# Patient Record
Sex: MALE | Race: WHITE | NOT HISPANIC OR LATINO | Employment: FULL TIME | URBAN - METROPOLITAN AREA
[De-identification: names, ages, dates, MRNs, and addresses within clinical notes are randomized per-mention and may not be internally consistent; named-entity substitution may affect disease eponyms.]

---

## 2017-04-13 ENCOUNTER — APPOINTMENT (OUTPATIENT)
Dept: LAB | Facility: HOSPITAL | Age: 57
End: 2017-04-13
Attending: COLON & RECTAL SURGERY
Payer: COMMERCIAL

## 2017-04-13 ENCOUNTER — GENERIC CONVERSION - ENCOUNTER (OUTPATIENT)
Dept: OTHER | Facility: OTHER | Age: 57
End: 2017-04-13

## 2017-04-13 ENCOUNTER — TRANSCRIBE ORDERS (OUTPATIENT)
Dept: LAB | Facility: HOSPITAL | Age: 57
End: 2017-04-13

## 2017-04-13 DIAGNOSIS — K59.1 FUNCTIONAL DIARRHEA: ICD-10-CM

## 2017-04-13 DIAGNOSIS — R19.4 FREQUENT BOWEL MOVEMENTS: ICD-10-CM

## 2017-04-13 DIAGNOSIS — K62.5 HEMORRHAGE OF RECTUM AND ANUS: Primary | ICD-10-CM

## 2017-04-13 DIAGNOSIS — K62.5 HEMORRHAGE OF RECTUM AND ANUS: ICD-10-CM

## 2017-04-13 LAB
ALBUMIN SERPL BCP-MCNC: 3.9 G/DL (ref 3.5–5)
ALP SERPL-CCNC: 48 U/L (ref 46–116)
ALT SERPL W P-5'-P-CCNC: 31 U/L (ref 12–78)
ANION GAP SERPL CALCULATED.3IONS-SCNC: 2 MMOL/L (ref 4–13)
AST SERPL W P-5'-P-CCNC: 24 U/L (ref 5–45)
BASOPHILS # BLD AUTO: 0.03 THOUSANDS/ΜL (ref 0–0.1)
BASOPHILS NFR BLD AUTO: 1 % (ref 0–1)
BILIRUB SERPL-MCNC: 0.52 MG/DL (ref 0.2–1)
BUN SERPL-MCNC: 16 MG/DL (ref 5–25)
CALCIUM SERPL-MCNC: 9.3 MG/DL (ref 8.3–10.1)
CHLORIDE SERPL-SCNC: 105 MMOL/L (ref 100–108)
CO2 SERPL-SCNC: 32 MMOL/L (ref 21–32)
CREAT SERPL-MCNC: 0.88 MG/DL (ref 0.6–1.3)
EOSINOPHIL # BLD AUTO: 0.13 THOUSAND/ΜL (ref 0–0.61)
EOSINOPHIL NFR BLD AUTO: 2 % (ref 0–6)
ERYTHROCYTE [DISTWIDTH] IN BLOOD BY AUTOMATED COUNT: 13.2 % (ref 11.6–15.1)
GFR SERPL CREATININE-BSD FRML MDRD: >60 ML/MIN/1.73SQ M
GLUCOSE SERPL-MCNC: 137 MG/DL (ref 65–140)
HCT VFR BLD AUTO: 33.5 % (ref 36.5–49.3)
HGB BLD-MCNC: 10.9 G/DL (ref 12–17)
LYMPHOCYTES # BLD AUTO: 1.39 THOUSANDS/ΜL (ref 0.6–4.47)
LYMPHOCYTES NFR BLD AUTO: 26 % (ref 14–44)
MCH RBC QN AUTO: 30.3 PG (ref 26.8–34.3)
MCHC RBC AUTO-ENTMCNC: 32.5 G/DL (ref 31.4–37.4)
MCV RBC AUTO: 93 FL (ref 82–98)
MONOCYTES # BLD AUTO: 0.49 THOUSAND/ΜL (ref 0.17–1.22)
MONOCYTES NFR BLD AUTO: 9 % (ref 4–12)
NEUTROPHILS # BLD AUTO: 3.29 THOUSANDS/ΜL (ref 1.85–7.62)
NEUTS SEG NFR BLD AUTO: 62 % (ref 43–75)
NRBC BLD AUTO-RTO: 0 /100 WBCS
PLATELET # BLD AUTO: 220 THOUSANDS/UL (ref 149–390)
PMV BLD AUTO: 9.7 FL (ref 8.9–12.7)
POTASSIUM SERPL-SCNC: 4.3 MMOL/L (ref 3.5–5.3)
PROT SERPL-MCNC: 7.2 G/DL (ref 6.4–8.2)
RBC # BLD AUTO: 3.6 MILLION/UL (ref 3.88–5.62)
SODIUM SERPL-SCNC: 139 MMOL/L (ref 136–145)
WBC # BLD AUTO: 5.35 THOUSAND/UL (ref 4.31–10.16)

## 2017-04-13 PROCEDURE — 87899 AGENT NOS ASSAY W/OPTIC: CPT | Performed by: COLON & RECTAL SURGERY

## 2017-04-13 PROCEDURE — 87045 FECES CULTURE AEROBIC BACT: CPT | Performed by: COLON & RECTAL SURGERY

## 2017-04-13 PROCEDURE — 87015 SPECIMEN INFECT AGNT CONCNTJ: CPT | Performed by: COLON & RECTAL SURGERY

## 2017-04-13 PROCEDURE — 87046 STOOL CULTR AEROBIC BACT EA: CPT | Performed by: COLON & RECTAL SURGERY

## 2017-04-13 PROCEDURE — 85025 COMPLETE CBC W/AUTO DIFF WBC: CPT

## 2017-04-13 PROCEDURE — 36415 COLL VENOUS BLD VENIPUNCTURE: CPT

## 2017-04-13 PROCEDURE — 87493 C DIFF AMPLIFIED PROBE: CPT | Performed by: COLON & RECTAL SURGERY

## 2017-04-13 PROCEDURE — 80053 COMPREHEN METABOLIC PANEL: CPT

## 2017-04-14 LAB — C DIFF TOX GENS STL QL NAA+PROBE: NORMAL

## 2017-04-15 LAB
BACTERIA STL CULT: NORMAL
BACTERIA STL CULT: NORMAL

## 2017-04-17 ENCOUNTER — HOSPITAL ENCOUNTER (OUTPATIENT)
Facility: HOSPITAL | Age: 57
Setting detail: OUTPATIENT SURGERY
Discharge: HOME | End: 2017-04-17
Attending: COLON & RECTAL SURGERY | Admitting: COLON & RECTAL SURGERY
Payer: COMMERCIAL

## 2017-04-17 ENCOUNTER — ANESTHESIA EVENT (OUTPATIENT)
Dept: GASTROENTEROLOGY | Facility: HOSPITAL | Age: 57
End: 2017-04-17
Payer: COMMERCIAL

## 2017-04-17 ENCOUNTER — ANESTHESIA (OUTPATIENT)
Dept: GASTROENTEROLOGY | Facility: HOSPITAL | Age: 57
End: 2017-04-17
Payer: COMMERCIAL

## 2017-04-17 ENCOUNTER — GENERIC CONVERSION - ENCOUNTER (OUTPATIENT)
Dept: OTHER | Facility: OTHER | Age: 57
End: 2017-04-17

## 2017-04-17 VITALS
DIASTOLIC BLOOD PRESSURE: 81 MMHG | TEMPERATURE: 97.6 F | RESPIRATION RATE: 16 BRPM | SYSTOLIC BLOOD PRESSURE: 123 MMHG | HEIGHT: 70 IN | OXYGEN SATURATION: 100 % | BODY MASS INDEX: 23.62 KG/M2 | HEART RATE: 74 BPM | WEIGHT: 165 LBS

## 2017-04-17 DIAGNOSIS — K62.5 RECTAL BLEEDING: ICD-10-CM

## 2017-04-17 PROCEDURE — 88305 TISSUE EXAM BY PATHOLOGIST: CPT | Performed by: INTERNAL MEDICINE

## 2017-04-17 RX ORDER — PROPOFOL 10 MG/ML
INJECTION, EMULSION INTRAVENOUS CONTINUOUS PRN
Status: DISCONTINUED | OUTPATIENT
Start: 2017-04-17 | End: 2017-04-17 | Stop reason: SURG

## 2017-04-17 RX ORDER — ALPRAZOLAM 0.25 MG/1
0.25 TABLET ORAL
COMMUNITY
End: 2018-02-20 | Stop reason: SDUPTHER

## 2017-04-17 RX ORDER — PROPOFOL 10 MG/ML
INJECTION, EMULSION INTRAVENOUS AS NEEDED
Status: DISCONTINUED | OUTPATIENT
Start: 2017-04-17 | End: 2017-04-17 | Stop reason: SURG

## 2017-04-17 RX ORDER — ASCORBIC ACID 500 MG
500 TABLET ORAL DAILY
COMMUNITY

## 2017-04-17 RX ORDER — PROPOFOL 10 MG/ML
INJECTION, EMULSION INTRAVENOUS CONTINUOUS PRN
Status: DISCONTINUED | OUTPATIENT
Start: 2017-04-17 | End: 2017-04-17

## 2017-04-17 RX ORDER — SODIUM CHLORIDE 9 MG/ML
20 INJECTION, SOLUTION INTRAVENOUS CONTINUOUS
Status: DISCONTINUED | OUTPATIENT
Start: 2017-04-17 | End: 2017-04-17 | Stop reason: HOSPADM

## 2017-04-17 RX ORDER — LORAZEPAM 0.5 MG/1
0.5 TABLET ORAL AS NEEDED
COMMUNITY
End: 2018-02-20 | Stop reason: CLARIF

## 2017-04-17 RX ADMIN — SODIUM CHLORIDE 20 ML/HR: 0.9 INJECTION, SOLUTION INTRAVENOUS at 08:07

## 2017-04-17 RX ADMIN — PROPOFOL 120 MCG/KG/MIN: 10 INJECTION, EMULSION INTRAVENOUS at 09:38

## 2017-04-17 RX ADMIN — PROPOFOL 110 MG: 10 INJECTION, EMULSION INTRAVENOUS at 08:07

## 2017-04-17 RX ADMIN — LIDOCAINE HYDROCHLORIDE 100 MG: 20 INJECTION, SOLUTION INTRAVENOUS at 08:07

## 2017-08-01 ENCOUNTER — LAB CONVERSION - ENCOUNTER (OUTPATIENT)
Dept: OTHER | Facility: OTHER | Age: 57
End: 2017-08-01

## 2017-08-01 LAB
BASOPHILS # BLD AUTO: 0.7 %
BASOPHILS # BLD AUTO: 54 CELLS/UL (ref 0–200)
DEPRECATED RDW RBC AUTO: 13.7 % (ref 11–15)
EOSINOPHIL # BLD AUTO: 254 CELLS/UL (ref 15–500)
EOSINOPHIL # BLD AUTO: 3.3 %
HCT VFR BLD AUTO: 45.6 % (ref 38.5–50)
HGB BLD-MCNC: 14.6 G/DL (ref 13.2–17.1)
LYMPHOCYTES # BLD AUTO: 2533 CELLS/UL (ref 850–3900)
LYMPHOCYTES # BLD AUTO: 32.9 %
MCH RBC QN AUTO: 28.1 PG (ref 27–33)
MCHC RBC AUTO-ENTMCNC: 32 G/DL (ref 32–36)
MCV RBC AUTO: 87.9 FL (ref 80–100)
MONOCYTES # BLD AUTO: 670 CELLS/UL (ref 200–950)
MONOCYTES (HISTORICAL): 8.7 %
NEUTROPHILS # BLD AUTO: 4189 CELLS/UL (ref 1500–7800)
NEUTROPHILS # BLD AUTO: 54.4 %
PLATELET # BLD AUTO: 240 THOUSAND/UL (ref 140–400)
PMV BLD AUTO: 10.4 FL (ref 7.5–12.5)
RBC # BLD AUTO: 5.19 MILLION/UL (ref 4.2–5.8)
WBC # BLD AUTO: 7.7 THOUSAND/UL (ref 3.8–10.8)

## 2017-08-02 ENCOUNTER — GENERIC CONVERSION - ENCOUNTER (OUTPATIENT)
Dept: OTHER | Facility: OTHER | Age: 57
End: 2017-08-02

## 2017-08-17 ENCOUNTER — ALLSCRIPTS OFFICE VISIT (OUTPATIENT)
Dept: OTHER | Facility: OTHER | Age: 57
End: 2017-08-17

## 2018-01-10 NOTE — PROGRESS NOTES
Chief Complaint  Adacel ( Tdap) administered L deltoid lot # [id number] Expiration date 6/5/2018, Zostavax ( shingles) administered R upper arm subcutaneous Lot # [id number] Exp 2/17/2017  Pt waited 10 minutes in room with no reaction were noticed  Active Problems    1  Chronic lower back pain (724 2,338 29) (M54 5,G89 29)   2  Elevated blood pressure (401 9) (I10)   3  Encounter for preventive health examination (V70 0) (Z00 00)   4  Encounter for prostate cancer screening (V76 44) (Z12 5)   5  Need for diphtheria-tetanus-pertussis (Tdap) vaccine, adult/adolescent (V06 1) (Z23)   6  Need for shingles vaccine (V04 89) (Z23)    Current Meds   1  Aleve TABS; Therapy: (Recorded:09Egc3864) to Recorded   2  Vitamin C CAPS; Therapy: (Recorded:64Fqn4984) to Recorded    Allergies    1  No Known Drug Allergies    Plan  Need for diphtheria-tetanus-pertussis (Tdap) vaccine, adult/adolescent    · Tdap (Adacel)  Need for shingles vaccine    · Zoster (Zostavax)    Future Appointments    Date/Time Provider Specialty Site   08/18/2016 09:40 AM Oma Florez MD Family Medicine [address] [place] [address]     Signatures   Electronically signed by :  Jacqui Esparza MD; May 24 2016  5:26PM EST                       (Review)

## 2018-01-12 NOTE — PROGRESS NOTES
Assessment    1  Encounter for preventive health examination (V70 0) (Z00 00)   2  Hyperlipidemia (272 4) (E78 5)   3  IFG (impaired fasting glucose) (790 21) (R73 01)   4  Encounter for prostate cancer screening (V76 44) (Z12 5)   5  Change in mole (216 9) (L81 9)    Plan  Acne vulgaris    · Minocycline HCl - 50 MG Oral Capsule; take 1 capsule daily  Change in mole    · 2 - Mary RAY, Mony Gibbons  (Dermatology) Co-Management  *  Status: Hold For -  Scheduling  Requested for: 02IKZ8638  Care Summary provided  : Yes  Chronic lower back pain    · Aleve TABS   · Cyclobenzaprine HCl - 10 MG Oral Tablet  Encounter for preventive health examination    · *VB-Depression Screening; Status:Complete;   Done: 31KUL8355 10:32AM  Encounter for preventive health examination, Encounter for prostate cancer screening,  Hyperlipidemia, IFG (impaired fasting glucose)    · (Q) COMPREHENSIVE METABOLIC PNL W/ADJUSTED CALCIUM; Status:Active; Requested for:83Zps1252;    · (Q) HEMOGLOBIN A1c WITH eAG; Status:Active; Requested for:83Ojd8898;    · (Q) LIPID PANEL WITH REFLEX TO DIRECT LDL; Status:Active; Requested  for:27Azr7045;    · (Q) PSA, TOTAL WITH REFLEX TO PSA, FREE; Status:Active; Requested  for:41Kcb6953;   Hyperlipidemia    · Eat a low fat and low cholesterol diet ; Status:Complete;   Done: 93XJT0965 10:31AM   · Eat no more than 30 grams of fat per day ; Status:Complete;   Done: 21RQH5041  10:31AM  IFG (impaired fasting glucose)    · Diets that are low in carbohydrates and high in protein are very popular for weight loss ;  Status:Complete;   Done: 95WYU3464 10:31AM  Unlinked    · Vitamin C CAPS    Discussion/Summary  health maintenance visit Currently, he eats a healthy diet  Prostate cancer screening: the risks and benefits of prostate cancer screening were discussed and PSA was ordered  Colorectal cancer screening: the risks and benefits of colorectal cancer screening were discussed and colorectal cancer screening is current  Screening lab work includes glucose and lipid profile  The risks and benefits of immunizations were discussed and immunizations are up to date  Advice and education were given regarding helmet use, weight bearing exercise, seat belt use and sunscreen use  Patient discussion: discussed with the patient  The patient was counseled on Hepatitis C screening  Reviewed lab in 8/2017  CBC ok  CMP showed glu 114  low carb diet  Lipid 229/104/65/143 low fat diet  Refer to dermatology for mole check/acne  Colonoscopy 4/2017 uptodate  Will check PSA  Pros and cons educated pt  RTO in 6 months with labs  Possible side effects of new medications were reviewed with the patient/guardian today  The treatment plan was reviewed with the patient/guardian  The patient/guardian understands and agrees with the treatment plan      Chief Complaint  Patient here for yearly physical exam       History of Present Illness  , Adult Male: The patient is being seen for a health maintenance evaluation  The last health maintenance visit was 1 year(s) ago  Social History: Household members include spouse  He is   Work status: working full time and occupation: [employer]  The patient has never smoked cigarettes  He reports drinking 1 drinks per day and beer  He has never used illicit drugs  General Health: The patient's health since the last visit is described as good  He has regular dental visits  He denies vision problems  He denies hearing loss  Immunizations status: up to date  Lifestyle:  He consumes a diverse and healthy diet  He has weight concerns  He exercises regularly  He does not use tobacco  He denies alcohol use  He denies drug use  Screening: Prostate cancer screening includes prostate-specific antigen testing last year  Colorectal cancer screening includes a colonoscopy within the past ten years     Metabolic screening includes lipid profile performed within the past five years, glucose screening performed last year and thyroid function test performed last year  Cardiovascular risk factors: no hypertension, no diabetes, no high LDL cholesterol and no obesity  General health risks: no elevated prostate-specific antigen  Safety elements used: seat belt  Risk findings: no anxiety symptoms and no depression symptoms  HPI: Pt is here by himself  c/o mole in the back for years  Bigger now  No itchy, no pain  Pt states he always had sunburn while in the sun  Acne on face for years  He used minocycline as needed  Never seen dermatology  Hyperlipidemia---8/2017 lipid 229/104/65/143 Not on any meds  IFG---8/2017 fasting glucose 114  Anxiety---Pt had rectal bleeding in 4/2017  Colonoscopy 4/2017 was normal  Pt thought it caused by escitalopram, so he stopped it  Mood is OK now  Review of Systems    Constitutional: No fever or chills, feels well, no tiredness, no recent weight gain or weight loss  ENT: no complaints of earache, no hearing loss, no nosebleeds, no nasal discharge, no sore throat, no hoarseness  Cardiovascular: No complaints of slow heart rate, no fast heart rate, no chest pain, no palpitations, no leg claudication, no lower extremity  Respiratory: No complaints of shortness of breath, no wheezing, no cough, no SOB on exertion, no orthopnea or PND  Gastrointestinal: No complaints of abdominal pain, no constipation, no nausea or vomiting, no diarrhea or bloody stools  Musculoskeletal: No complaints of arthralgia, no myalgias, no joint swelling or stiffness, no limb pain or swelling  Active Problems    1  Acne vulgaris (706 1) (L70 0)   2  Anxiety (300 00) (F41 9)   3  Chronic lower back pain (724 2,338 29) (M54 5,G89 29)   4  Elevated blood pressure   5  Encounter for preventive health examination (V70 0) (Z00 00)   6  Encounter for prostate cancer screening (V76 44) (Z12 5)   7  Hyperlipidemia (272 4) (E78 5)   8  Insomnia (780 52) (G47 00)   9   Need for diphtheria-tetanus-pertussis (Tdap) vaccine, adult/adolescent (V06 1) (Z23)   10  Need for pneumococcal vaccination (V03 82) (Z23)   11  Need for shingles vaccine (V04 89) (Z23)    Past Medical History    · History of Back spasm (724 8) (M62 830)   · History of Blood in stool (578 1) (K92 1)   · History of High cholesterol (272 0) (E78 00)   · History of anemia (V12 3) (Z86 2)   · History of arthritis (V13 4) (Z87 39)   · History of backache (V13 59) (Z87 39)   · History of migraine (V12 49) (Z86 69)   · History of varicella (V12 09) (Z86 19)   · History of Knee pain (719 46) (M25 569)   · History of Tension (V62 89) (F48 9)    Surgical History    · History of Hernia Repair    Family History  Mother    · Family history of Colon cancer   · Family history of diabetes mellitus (V18 0) (Z83 3)  Father    · Family history of Heart problem    Social History    · Cigar smoker (305 1) (F17 290)   · Social drinker    Current Meds   1  Aleve TABS; Therapy: (Recorded:24Fgx3992) to Recorded   2  ALPRAZolam 0 25 MG Oral Tablet; TAKE 1 TABLET Daily PRN anxiety; Therapy: 39RWO7147 to (Evaluate:07Jan2017); Last Rx:77Fyz1483 Ordered   3  Cyclobenzaprine HCl - 10 MG Oral Tablet; take 1 tablet by mouth at bedtime if needed; Therapy: 06NNF8541 to (Evaluate:07Jan2017)  Requested for: 91DSF3395; Last   Rx:01Nyl6522 Ordered   4  Escitalopram Oxalate 5 MG Oral Tablet; TAKE 1 TABLET DAILY; Therapy: 70OUZ7738 to (Evaluate:06Jun2017)  Requested for: 07FCF8984; Last   Rx:05Znj8370; Status: ACTIVE - Renewal Denied Ordered   5  Minocycline HCl - 50 MG Oral Capsule; take 1 capsule daily; Therapy: 63FSC3778 to (Evaluate:89Jxo2943)  Requested for: 74Fmd0599; Last   Rx:38Dat7455 Ordered   6  TraZODone HCl - 50 MG Oral Tablet; Take 1 tablet daily; Therapy: 24PZZ5989 to (Evaluate:07Jan2017)  Requested for: 01HZU8669; Last   Rx:08Dec2016; Status: ACTIVE - Renewal Denied Ordered   7  Vitamin C CAPS;    Therapy: (Recorded:83Ovm2235) to Recorded    Allergies    1  No Known Drug Allergies    Vitals   Recorded: 17Aug2017 09:45AM   Temperature 97 7 F, Tympanic   Heart Rate 72, L Radial   Respiration 16   Systolic 796, LUE   Diastolic 88, LUE   Height 5 ft 9 5 in   Weight 155 lb 3 2 oz   BMI Calculated 22 59   BSA Calculated 1 86   Pain Scale 3     Physical Exam    Constitutional   General appearance: No acute distress, well appearing and well nourished  Ears, Nose, Mouth, and Throat   External inspection of ears and nose: Normal     Otoscopic examination: Tympanic membranes translucent with normal light reflex  Canals patent without erythema  Nasal mucosa, septum, and turbinates: Normal without edema or erythema  Lips, teeth, and gums: Normal, good dentition  Oropharynx: Normal with no erythema, edema, exudate or lesions  Neck   Neck: Supple, symmetric, trachea midline, no masses  Thyroid: Normal, no thyromegaly  Pulmonary   Respiratory effort: No increased work of breathing or signs of respiratory distress  Auscultation of lungs: Clear to auscultation  Cardiovascular   Auscultation of heart: Normal rate and rhythm, normal S1 and S2, no murmurs  Carotid pulses: 2+ bilaterally  Examination of extremities for edema and/or varicosities: Normal     Abdomen   Abdomen: Non-tender, no masses  Liver and spleen: No hepatomegaly or splenomegaly  Lymphatic   Palpation of lymph nodes in neck: No lymphadenopathy  Musculoskeletal   Gait and station: Normal        Signatures   Electronically signed by :  Elgin Kent MD; Aug 17 2017 10:32AM EST                       (Author)

## 2018-01-14 VITALS
DIASTOLIC BLOOD PRESSURE: 88 MMHG | HEART RATE: 72 BPM | TEMPERATURE: 97.7 F | BODY MASS INDEX: 22.22 KG/M2 | HEIGHT: 70 IN | SYSTOLIC BLOOD PRESSURE: 132 MMHG | WEIGHT: 155.2 LBS | RESPIRATION RATE: 16 BRPM

## 2018-01-14 NOTE — RESULT NOTES
Verified Results  (Q) COMPREHENSIVE METABOLIC PNL W/ADJUSTED CALCIUM 54Wob6679 09:33AM [facility]     Test Name Result Flag Reference   GLUCOSE 114 mg/dL H 65-99   Fasting reference interval     For someone without known diabetes, a glucose value  between 100 and 125 mg/dL is consistent with  prediabetes and should be confirmed with a  follow-up test    UREA NITROGEN (BUN) 17 mg/dL  7-25   CREATININE 0 93 mg/dL  0 70-1 33   For patients >52years of age, the reference limit  for Creatinine is approximately 13% higher for people  identified as -American  eGFR NON-AFR  AMERICAN 91 mL/min/1 73m2  > OR = 60   eGFR AFRICAN AMERICAN 106 mL/min/1 73m2  > OR = 60   BUN/CREATININE RATIO   2-48   NOT APPLICABLE (calc)   SODIUM 141 mmol/L  135-146   POTASSIUM 4 8 mmol/L  3 5-5 3   CHLORIDE 104 mmol/L     CARBON DIOXIDE 31 mmol/L  20-31   CALCIUM 9 6 mg/dL  8 6-10 3   CALCIUM (ADJUSTED FOR$ALBUMIN) 9 7 mg/dL (calc)  8 6-10 2   PROTEIN, TOTAL 6 9 g/dL  6 1-8 1   ALBUMIN 4 3 g/dL  3 6-5 1   GLOBULIN 2 6 g/dL (calc)  1 9-3 7   ALBUMIN/GLOBULIN RATIO 1 7 (calc)  1 0-2 5   BILIRUBIN, TOTAL 0 5 mg/dL  0 2-1 2   ALKALINE PHOSPHATASE 59 U/L     AST 21 U/L  10-35   ALT 20 U/L  9-46     (Q) LIPID PANEL WITH REFLEX TO DIRECT LDL 78Fys6665 09:33AM [facility]     Test Name Result Flag Reference   CHOLESTEROL, TOTAL 229 mg/dL H 125-200   HDL CHOLESTEROL 65 mg/dL  > OR = 40   TRIGLICERIDES 006 mg/dL  <150   LDL-CHOLESTEROL 143 mg/dL (calc) H <130   Desirable range <100 mg/dL for patients with CHD or  diabetes and <70 mg/dL for diabetic patients with  known heart disease  CHOL/HDLC RATIO 3 5 (calc)  < OR = 5 0   NON HDL CHOLESTEROL 164 mg/dL (calc) H    Target for non-HDL cholesterol is 30 mg/dL higher than   LDL cholesterol target       (Q) CBC (INCLUDES DIFF/PLT) (REFL) [id number] 09:33AM Annemaru De Dios   REPORT COMMENT:  FASTING:YES     Test Name Result Flag Reference   WHITE BLOOD CELL COUNT 7 7 Thousand/uL  3 8-10 8 RED BLOOD CELL COUNT 5 19 Million/uL  4 20-5 80   HEMOGLOBIN 14 6 g/dL  13 2-17 1   HEMATOCRIT 45 6 %  38 5-50 0   MCV 87 9 fL  80 0-100 0   MCH 28 1 pg  27 0-33 0   MCHC 32 0 g/dL  32 0-36 0   RDW 13 7 %  11 0-15 0   PLATELET COUNT 090 Thousand/uL  140-400   MPV 10 4 fL  7 5-12 5   ABSOLUTE NEUTROPHILS 4189 cells/uL  0965-1008   ABSOLUTE LYMPHOCYTES 2533 cells/uL  850-3900   ABSOLUTE MONOCYTES 670 cells/uL  200-950   ABSOLUTE EOSINOPHILS 254 cells/uL     ABSOLUTE BASOPHILS 54 cells/uL  0-200   NEUTROPHILS 54 4 %     LYMPHOCYTES 32 9 %     MONOCYTES 8 7 %     EOSINOPHILS 3 3 %     BASOPHILS 0 7 %

## 2018-01-15 NOTE — PROGRESS NOTES
Chief Complaint  Pneumovax administered Today L deltoid lot # [id number] Exp 10/8/2017  Active Problems    1  Chronic lower back pain (724 2,338 29) (M54 5,G89 29)   2  Elevated blood pressure (401 9) (I10)   3  Encounter for preventive health examination (V70 0) (Z00 00)   4  Encounter for prostate cancer screening (V76 44) (Z12 5)   5  Need for diphtheria-tetanus-pertussis (Tdap) vaccine, adult/adolescent (V06 1) (Z23)   6  Need for pneumococcal vaccination (V03 82) (Z23)   7  Need for shingles vaccine (V04 89) (Z23)    Current Meds   1  Aleve TABS; Therapy: (Recorded:53Ikh6249) to Recorded   2  Vitamin C CAPS; Therapy: (Recorded:26Npk3550) to Recorded    Allergies    1  No Known Drug Allergies    Plan  Need for pneumococcal vaccination    · Pneumo (Pneumovax)    Future Appointments    Date/Time Provider Specialty Site   08/18/2016 09:40 AM Radha Walker MD Family Medicine [address] [place] [address]     Signatures   Electronically signed by :  Saúl Ma MD; Jun 23 2016  3:52PM EST                       (Review)

## 2018-01-15 NOTE — RESULT NOTES
Message   Total cholesterol 216 elevated   triglyceride 71 normal   HDL 76 good    ok   Pt should follow low fat diet     electrolytes normal   kidney function normal   liver enzymes normal   WBC normal   hemoglobin normal   platelet normal   PSA normal   TSH normal     Verified Results  (Q) CBC (INCLUDES DIFF/PLT) (REFL) [id number] 10:23AM [facility]     Test Name Result Flag Reference   WHITE BLOOD CELL COUNT 6 9 Thousand/uL  3 8-10 8   RED BLOOD CELL COUNT 4 85 Million/uL  4 20-5 80   HEMOGLOBIN 14 6 g/dL  13 2-17 1   HEMATOCRIT 44 9 %  38 5-50 0   MCV 92 5 fL  80 0-100 0   MCH 30 1 pg  27 0-33 0   MCHC 32 5 g/dL  32 0-36 0   RDW 13 9 %  11 0-15 0   PLATELET COUNT 052 Thousand/uL  140-400   MPV 8 4 fL  7 5-11 5   ABSOLUTE NEUTROPHILS 4181 cells/uL  0435-9599   ABSOLUTE LYMPHOCYTES 1815 cells/uL  850-3900   ABSOLUTE MONOCYTES 511 cells/uL  200-950   ABSOLUTE EOSINOPHILS 352 cells/uL     ABSOLUTE BASOPHILS 41 cells/uL  0-200   NEUTROPHILS 60 6 %     LYMPHOCYTES 26 3 %     MONOCYTES 7 4 %     EOSINOPHILS 5 1 %     BASOPHILS 0 6 %       (Q) COMPREHENSIVE METABOLIC PNL W/ADJUSTED CALCIUM [id number] 10:23AM [facility]     Test Name Result Flag Reference   GLUCOSE 90 mg/dL  65-99   Fasting reference interval   UREA NITROGEN (BUN) 21 mg/dL  7-25   CREATININE 0 83 mg/dL  0 70-1 33   For patients >52years of age, the reference limit  for Creatinine is approximately 13% higher for people  identified as -American  eGFR NON-AFR   AMERICAN 99 mL/min/1 73m2  > OR = 60   eGFR AFRICAN AMERICAN 115 mL/min/1 73m2  > OR = 60   BUN/CREATININE RATIO   7-40   NOT APPLICABLE (calc)   SODIUM 138 mmol/L  135-146   POTASSIUM 4 3 mmol/L  3 5-5 3   CHLORIDE 101 mmol/L     CARBON DIOXIDE 27 mmol/L  19-30   CALCIUM 9 3 mg/dL  8 6-10 3   CALCIUM (ADJUSTED FOR$ALBUMIN) 9 3 mg/dL (calc)  8 6-10 2   PROTEIN, TOTAL 6 9 g/dL  6 1-8 1   ALBUMIN 4 4 g/dL  3 6-5 1   GLOBULIN 2 5 g/dL (calc)  1 9-3 7   ALBUMIN/GLOBULIN RATIO 1 8 (calc)  1 0-2 5   BILIRUBIN, TOTAL 0 4 mg/dL  0 2-1 2   ALKALINE PHOSPHATASE 52 U/L     AST 24 U/L  10-35   ALT 19 U/L  9-46     (Q) LIPID PANEL WITH REFLEX TO DIRECT LDL [id number] 10:23 [facility]     Test Name Result Flag Reference   CHOLESTEROL, TOTAL 216 mg/dL H 125-200   HDL CHOLESTEROL 76 mg/dL  > OR = 40   TRIGLICERIDES 71 mg/dL  <028   LDL-CHOLESTEROL 126 mg/dL (calc)  <130   Desirable range <100 mg/dL for patients with CHD or  diabetes and <70 mg/dL for diabetic patients with  known heart disease  CHOL/HDLC RATIO 2 8 (calc)  < OR = 5 0   NON HDL CHOLESTEROL 140 mg/dL (calc)     Target for non-HDL cholesterol is 30 mg/dL higher than   LDL cholesterol target  (Q) PSA, TOTAL WITH REFLEX TO PSA, FREE [id number] 10:23 [facility]     Test Name Result Flag Reference   TOTAL PSA 0 9 ng/mL  < OR = 4 0   This test was performed using the [employer]  immunoassay method  Values obtained with other assay  methods cannot be used interchangeably  PSA levels,  regardless of value, should not be interpreted as  absolute evidence of the presence or absence of disease       (Q) TSH, 3RD GENERATION W/REFLEX TO FT4 [id number] 10:23AM [facility]   REPORT COMMENT:  FASTING:YES     Test Name Result Flag Reference   TSH W/REFLEX TO FT4 2 02 mIU/L  0 40-4 50

## 2018-01-15 NOTE — PROGRESS NOTES
Assessment    1  Encounter for preventive health examination (V70 0) (Z00 00)   2  Elevated blood pressure (401 9) (I10)   3  Chronic lower back pain (724 2,338 29) (M54 5,G89 29)   4  Cigar smoker (305 1) (F17 290)   5  Encounter for prostate cancer screening (V76 44) (Z12 5)    Plan  Chronic lower back pain, Elevated blood pressure, Encounter for preventive health  examination    · (1) URINALYSIS w URINE C/S REFLEX (will reflex a microscopy if leukocytes, occult  blood, or nitrites are not within normal limits); Status:Active; Requested for:01Alu2768;    · (Q) CBC (INCLUDES DIFF/PLT) (REFL); Status:Active; Requested for:08Fhx0360;    · (Q) COMPREHENSIVE METABOLIC PNL W/ADJUSTED CALCIUM; Status:Active; Requested for:31Qdz9653;    · (Q) LIPID PANEL WITH REFLEX TO DIRECT LDL; Status:Active; Requested  for:63Isw1418;    · (Q) TSH, 3RD GENERATION W/REFLEX TO FT4; Status:Active; Requested  for:46Gdy5917;   Elevated blood pressure    · Take your blood pressure twice a day, varying the time of day you check it  Record the  numbers, and bring them with you to your appointment ; Status:Complete;   Done:  77IZK8744 03:05PM   · We want to put you on the DASH diet for count1 calories ; Status:Complete;   Done:  37QRB4392 03:05PM   · You need to quit smoking ; Status:Complete;   Done: 05GJD2919 03:05PM   · Call [phone] if: You become dizzy or lightheaded, especially when you stand up  after sitting for a while ; Status:Complete;   Done: 02CXF5127 03:05PM   · Call [phone] if: You develop double vision (see two of everything) ;  Status:Complete;   Done: 15PCL0105 03:05PM   · Call [phone] if: Your blood pressure is frequently higher than 140/90 ;  Status:Complete;   Done: 62XYK2311 03:05PM   · Call 911 if:  You experience a new kind of chest pain (angina) or pressure ;  Status:Complete;   Done: 33QUQ7920 03:05PM   · Call 911 if: You have any symptoms of a stroke ; Status:Complete;   Done: 11XVZ9042  03:05PM   · Seek Immediate Medical Attention if: You have a severe headache that will not go away ;  Status:Complete;   Done: 03VLB2596 03:05PM   · Seek Immediate Medical Attention if: Your blood pressure is greater than 250/120 for 2  consecutive readings ; Status:Complete;   Done: 13ETA8209 03:05PM  Encounter for prostate cancer screening    · (Q) PSA, TOTAL WITH REFLEX TO PSA, FREE; Status:Active; Requested  for:28Hkk7489; Unlinked    · Vitamin C CAPS    Discussion/Summary  Impression: health maintenance visit  Currently, he eats a healthy diet and has an inadequate exercise regimen  Prostate cancer screening: the risks and benefits of prostate cancer screening were discussed and PSA was ordered  Colorectal cancer screening: the risks and benefits of colorectal cancer screening were discussed, colorectal cancer screening is current and Due for 2017 per pt  Screening lab work includes glucose, lipid profile and urinalysis  The risks and benefits of immunizations were discussed and Pt will call insurance for coverage of flu shot, zostavax and Tdap  Advice and education were given regarding nutrition, aerobic exercise, sunscreen use and seat belt use  Patient discussion: discussed with the patient  BP elevated in office today  Advised pt to check BP at home  Call office if BP always >150/90  DASH diet  Will check labs  Will check insurance for coverage of immunizations  FU colonoscopy as scheduled  Will check PSA  Pros and cons educated pt  RTO in 3 months for BP check  Chief Complaint  physical exam      History of Present Illness  HM, Adult Male: The patient is being seen for a health maintenance evaluation  The last health maintenance visit was 4 year(s) ago  Social History: Household members include live by himself  Work status: working full time and occupation: labor in [employer]  The patient smoke cigar daily   He reports frequent alcohol use, drinking 3 drinks per week and beer  He has never used illicit drugs  General Health: The patient's health since the last visit is described as good  He has regular dental visits  He denies vision problems  He denies hearing loss  Immunizations status: not up to date   refused flu shot  Lifestyle:  He consumes a diverse and healthy diet  He does not have any weight concerns  He does not exercise regularly  He uses tobacco  He consumes alcohol  He denies drug use  Screening: Prostate cancer screening includes uncertain timing of prostate-specific antigen testing  Colorectal cancer screening includes last colonoscopy done 2011 and Mother had colon cancer at age of 72  Metabolic screening includes uncertain timing of his last lipid profile, uncertain timing of his last glucose screening and uncertain timing of his last thyroid function test      Cardiovascular risk factors: high LDL cholesterol, but no hypertension, no diabetes and no low HDL cholesterol  Safety elements used: seat belt  Risk findings: no anxiety symptoms and no depression symptoms  HPI: Pt is here to establish care and for a physical exam    Last PCP was 4 years ago  Feels fine  Pt states he does a lot of physical work for his job  Has chronic lower back pain, aleve helped  Hx of hyperlipidemia, was on simvastatin 10mg before, but stopped for a while  Review of Systems    Constitutional: No fever or chills, feels well, no tiredness, no recent weight gain or weight loss  ENT: no complaints of earache, no hearing loss, no nosebleeds, no nasal discharge, no sore throat, no hoarseness  Cardiovascular: No complaints of slow heart rate, no fast heart rate, no chest pain, no palpitations, no leg claudication, no lower extremity  Respiratory: No complaints of shortness of breath, no wheezing, no cough, no SOB on exertion, no orthopnea or PND     Gastrointestinal: No complaints of abdominal pain, no constipation, no nausea or vomiting, no diarrhea or bloody stools  Musculoskeletal: No complaints of arthralgia, no myalgias, no joint swelling or stiffness, no limb pain or swelling  Past Medical History    · History of High cholesterol (272 0) (E78 0)   · History of backache (V13 59) (Z87 39)   · History of Knee pain (719 46) (M25 569)    Surgical History    · History of Hernia Repair    Family History  Mother    · Family history of Colon cancer   · Family history of diabetes mellitus (V18 0) (Z83 3)  Father    · Family history of Heart problem    Social History    · Cigar smoker (305 1) (F17 290)   · Social drinker    Current Meds   1  Aleve TABS; Therapy: (Recorded:77Ygg7252) to Recorded   2  Vitamin C CAPS; Therapy: (Recorded:06Boc0955) to Recorded    Allergies    1  No Known Drug Allergies    Vitals   Recorded: 43XXN3016 02:52PM Recorded: 81FKF0906 02:28PM   Temperature  97 4 F   Heart Rate  94   Respiration  16   Systolic 988 096   Diastolic 401 84   Height  5 ft 9 5 in   Weight  153 lb 4 00 oz   BMI Calculated  22 31   BSA Calculated  1 86   O2 Saturation  96   Pain Scale  0     Physical Exam    Constitutional   General appearance: No acute distress, well appearing and well nourished  Ears, Nose, Mouth, and Throat   External inspection of ears and nose: Normal     Otoscopic examination: Tympanic membranes translucent with normal light reflex  Canals patent without erythema  Nasal mucosa, septum, and turbinates: Normal without edema or erythema  Lips, teeth, and gums: Normal, good dentition  Oropharynx: Normal with no erythema, edema, exudate or lesions  Neck   Neck: Supple, symmetric, trachea midline, no masses  Thyroid: Normal, no thyromegaly  Pulmonary   Respiratory effort: No increased work of breathing or signs of respiratory distress  Auscultation of lungs: Clear to auscultation  Cardiovascular   Auscultation of heart: Normal rate and rhythm, normal S1 and S2, no murmurs  Carotid pulses: 2+ bilaterally      Examination of extremities for edema and/or varicosities: Normal     Abdomen   Abdomen: Non-tender, no masses  Liver and spleen: No hepatomegaly or splenomegaly  Lymphatic   Palpation of lymph nodes in neck: No lymphadenopathy  Musculoskeletal   Gait and station: Normal        Signatures   Electronically signed by :  Yandy Zafar MD; May 20 2016  3:06PM EST                       (Author)

## 2018-02-08 LAB
ALBUMIN SERPL-MCNC: 4.6 G/DL (ref 3.6–5.1)
ALBUMIN/GLOB SERPL: 2 (CALC) (ref 1–2.5)
ALP SERPL-CCNC: 59 U/L (ref 40–115)
ALT SERPL-CCNC: 19 U/L (ref 9–46)
AST SERPL-CCNC: 24 U/L (ref 10–35)
BILIRUB SERPL-MCNC: 0.9 MG/DL (ref 0.2–1.2)
BUN SERPL-MCNC: 14 MG/DL (ref 7–25)
BUN/CREAT SERPL: NORMAL (CALC) (ref 6–22)
CALCIUM ALBUM COR SERPL-MCNC: 9.8 MG/DL (CALC) (ref 8.6–10.2)
CALCIUM SERPL-MCNC: 9.9 MG/DL (ref 8.6–10.3)
CHLORIDE SERPL-SCNC: 102 MMOL/L (ref 98–110)
CHOLEST SERPL-MCNC: 205 MG/DL
CHOLEST/HDLC SERPL: 2.7 (CALC)
CO2 SERPL-SCNC: 31 MMOL/L (ref 20–31)
CREAT SERPL-MCNC: 0.94 MG/DL (ref 0.7–1.33)
EST. AVERAGE GLUCOSE BLD GHB EST-MCNC: 111 (CALC)
EST. AVERAGE GLUCOSE BLD GHB EST-SCNC: 6.2 (CALC)
GLOBULIN SER CALC-MCNC: 2.3 G/DL (CALC) (ref 1.9–3.7)
GLUCOSE SERPL-MCNC: 94 MG/DL (ref 65–99)
HBA1C MFR BLD: 5.5 % OF TOTAL HGB
HDLC SERPL-MCNC: 76 MG/DL
LDLC SERPL CALC-MCNC: 112 MG/DL (CALC)
NONHDLC SERPL-MCNC: 129 MG/DL (CALC)
POTASSIUM SERPL-SCNC: 4.2 MMOL/L (ref 3.5–5.3)
PROT SERPL-MCNC: 6.9 G/DL (ref 6.1–8.1)
PSA SERPL-MCNC: 0.7 NG/ML
SL AMB EGFR AFRICAN AMERICAN: 104 ML/MIN/1.73M2
SL AMB EGFR NON AFRICAN AMERICAN: 90 ML/MIN/1.73M2
SODIUM SERPL-SCNC: 142 MMOL/L (ref 135–146)
TRIGL SERPL-MCNC: 81 MG/DL

## 2018-02-20 ENCOUNTER — OFFICE VISIT (OUTPATIENT)
Dept: FAMILY MEDICINE CLINIC | Facility: CLINIC | Age: 58
End: 2018-02-20
Payer: COMMERCIAL

## 2018-02-20 VITALS
TEMPERATURE: 97.2 F | HEART RATE: 80 BPM | SYSTOLIC BLOOD PRESSURE: 120 MMHG | RESPIRATION RATE: 16 BRPM | BODY MASS INDEX: 21.39 KG/M2 | HEIGHT: 70 IN | WEIGHT: 149.4 LBS | DIASTOLIC BLOOD PRESSURE: 80 MMHG

## 2018-02-20 DIAGNOSIS — G89.29 CHRONIC BILATERAL LOW BACK PAIN WITHOUT SCIATICA: Primary | ICD-10-CM

## 2018-02-20 DIAGNOSIS — R73.01 IFG (IMPAIRED FASTING GLUCOSE): ICD-10-CM

## 2018-02-20 DIAGNOSIS — M54.50 CHRONIC BILATERAL LOW BACK PAIN WITHOUT SCIATICA: Primary | ICD-10-CM

## 2018-02-20 DIAGNOSIS — F41.9 ANXIETY: ICD-10-CM

## 2018-02-20 DIAGNOSIS — E78.2 MIXED HYPERLIPIDEMIA: ICD-10-CM

## 2018-02-20 DIAGNOSIS — G47.00 INSOMNIA, UNSPECIFIED TYPE: ICD-10-CM

## 2018-02-20 PROBLEM — D22.9 CHANGE IN MOLE: Status: RESOLVED | Noted: 2017-08-17 | Resolved: 2018-02-20

## 2018-02-20 PROBLEM — D22.9 CHANGE IN MOLE: Status: ACTIVE | Noted: 2017-08-17

## 2018-02-20 PROCEDURE — 99214 OFFICE O/P EST MOD 30 MIN: CPT | Performed by: FAMILY MEDICINE

## 2018-02-20 RX ORDER — ALPRAZOLAM 0.25 MG/1
0.25 TABLET ORAL
Qty: 30 TABLET | Refills: 0 | Status: SHIPPED | OUTPATIENT
Start: 2018-02-20 | End: 2018-02-20 | Stop reason: SDUPTHER

## 2018-02-20 RX ORDER — CYCLOBENZAPRINE HCL 10 MG
10 TABLET ORAL DAILY PRN
Qty: 30 TABLET | Refills: 1 | Status: SHIPPED | OUTPATIENT
Start: 2018-02-20 | End: 2018-02-20 | Stop reason: SDUPTHER

## 2018-02-20 RX ORDER — CYCLOBENZAPRINE HCL 10 MG
10 TABLET ORAL DAILY PRN
Qty: 90 TABLET | Refills: 1 | Status: SHIPPED | OUTPATIENT
Start: 2018-02-20 | End: 2018-08-21 | Stop reason: SDUPTHER

## 2018-02-20 RX ORDER — MINOCYCLINE HYDROCHLORIDE 50 MG/1
1 CAPSULE ORAL DAILY
COMMUNITY
Start: 2016-08-18 | End: 2018-08-21 | Stop reason: SDUPTHER

## 2018-02-20 RX ORDER — ALPRAZOLAM 0.25 MG/1
0.25 TABLET ORAL
Qty: 90 TABLET | Refills: 0 | Status: SHIPPED | OUTPATIENT
Start: 2018-02-20 | End: 2022-02-16 | Stop reason: SDUPTHER

## 2018-02-20 NOTE — PROGRESS NOTES
Patient is here for follow up  Assessment/Plan:    Reviewed lab in 2/2018  CBC ok  CMP ok  TSH normal  HgA1C 5 5 normal  Low carb diet  Lipid 205/81/76/112 low fat diet  PSA normal  Continue current meds  Give refills of xanax and muscle relaxant  SE educated pt  Discussed at length re interaction and issues with benzodiazepines and muscle relaxant, he understood the risk  Educated pt do not take muscle relaxer and benzo at the same time  I discussed the risks of taking these medications together  Pt understood  Got flu shot this season  Colonoscopy 4/2017, repeat in 5 years  PSA yearly  Pros and cons educated pt  RTO in 6 months with physical exam       Diagnoses and all orders for this visit:    Chronic bilateral low back pain without sciatica  -     Discontinue: cyclobenzaprine (FLEXERIL) 10 mg tablet; Take 1 tablet (10 mg total) by mouth daily as needed for muscle spasms for up to 30 days  -     cyclobenzaprine (FLEXERIL) 10 mg tablet; Take 1 tablet (10 mg total) by mouth daily as needed for muscle spasms for up to 90 days    Anxiety  -     Discontinue: ALPRAZolam (XANAX) 0 25 mg tablet; Take 1 tablet (0 25 mg total) by mouth daily at bedtime as needed for anxiety or sleep for up to 30 days  -     ALPRAZolam (XANAX) 0 25 mg tablet; Take 1 tablet (0 25 mg total) by mouth daily at bedtime as needed for anxiety or sleep for up to 90 days    Insomnia, unspecified type  Comments:  Do not take xanax and flexeril at the same time  Side effects educated pt  Pt understood  Mixed hyperlipidemia  Comments:  Diet control  IFG (impaired fasting glucose)  Comments:  Diet control  Other orders  -     minocycline (MINOCIN) 50 mg capsule; Take 1 capsule by mouth daily          Subjective:      Patient ID: David Pringle is a 62 y o  male  HPI    Pt is here by himself  FU dermatology for acne  He is on minocycline for acne flare up  Also got triamcinolone for eczema  No worry about mole in back  Anxiety---use xanax 0 25mg before work which helped, 3-4/week  A lot of stress in his job  Denies depression  Chronic lower back pain/neck pain---It involved a lot of physical work at work  Feels muscle spasm after work  Muscle relaxant helped before  Would like refills  Insomnia---muscle relaxant helped sleep before, would like refills  Hyperlipidemia---Diet control  Not on any meds  IFG---Diet control  Not on any meds  Denies smoking  Quit smoking 20 years  Smoked 0 5ppd for 20 years  Alcohol socially  No drugs  The following portions of the patient's history were reviewed and updated as appropriate: allergies, current medications, past family history, past medical history, past social history, past surgical history and problem list     Review of Systems   Constitutional: Negative for appetite change, chills and fever  HENT: Negative for congestion, ear pain, sinus pain and sore throat  Eyes: Negative for discharge and itching  Respiratory: Negative for apnea, cough, chest tightness, shortness of breath and wheezing  Cardiovascular: Negative for chest pain, palpitations and leg swelling  Gastrointestinal: Negative for abdominal pain, anal bleeding, constipation, diarrhea, nausea and vomiting  Endocrine: Negative for cold intolerance, heat intolerance and polyuria  Genitourinary: Negative for difficulty urinating and dysuria  Musculoskeletal: Negative for arthralgias, back pain and myalgias  Skin: Negative for rash  Neurological: Negative for dizziness and headaches  Psychiatric/Behavioral: Negative for agitation  Objective:      /80 (BP Location: Left arm, Patient Position: Sitting, Cuff Size: Adult)   Pulse 80   Temp (!) 97 2 °F (36 2 °C) (Tympanic)   Resp 16   Ht 5' 10" (1 778 m)   Wt 67 8 kg (149 lb 6 4 oz)   BMI 21 44 kg/m²          Physical Exam   Constitutional: He appears well-developed     HENT:   Head: Normocephalic and atraumatic  Eyes: Conjunctivae are normal  Pupils are equal, round, and reactive to light  Neck: Normal range of motion  Neck supple  Cardiovascular: Normal rate, regular rhythm, normal heart sounds and intact distal pulses  Pulmonary/Chest: Effort normal and breath sounds normal    Abdominal: Soft  Bowel sounds are normal    Musculoskeletal: Normal range of motion  Neurological: He is alert

## 2018-08-21 ENCOUNTER — OFFICE VISIT (OUTPATIENT)
Dept: FAMILY MEDICINE CLINIC | Facility: CLINIC | Age: 58
End: 2018-08-21
Payer: COMMERCIAL

## 2018-08-21 VITALS
TEMPERATURE: 97.4 F | RESPIRATION RATE: 16 BRPM | OXYGEN SATURATION: 97 % | HEART RATE: 76 BPM | SYSTOLIC BLOOD PRESSURE: 120 MMHG | DIASTOLIC BLOOD PRESSURE: 78 MMHG | BODY MASS INDEX: 20.9 KG/M2 | HEIGHT: 70 IN | WEIGHT: 146 LBS

## 2018-08-21 DIAGNOSIS — M54.50 CHRONIC BILATERAL LOW BACK PAIN WITHOUT SCIATICA: ICD-10-CM

## 2018-08-21 DIAGNOSIS — L70.0 ACNE VULGARIS: ICD-10-CM

## 2018-08-21 DIAGNOSIS — G89.29 CHRONIC BILATERAL LOW BACK PAIN WITHOUT SCIATICA: ICD-10-CM

## 2018-08-21 DIAGNOSIS — Z00.00 ENCOUNTER FOR PREVENTIVE CARE: Primary | ICD-10-CM

## 2018-08-21 PROCEDURE — 99396 PREV VISIT EST AGE 40-64: CPT | Performed by: FAMILY MEDICINE

## 2018-08-21 RX ORDER — MINOCYCLINE HYDROCHLORIDE 50 MG/1
50 CAPSULE ORAL DAILY
Qty: 90 CAPSULE | Refills: 3 | Status: SHIPPED | OUTPATIENT
Start: 2018-08-21 | End: 2018-11-19

## 2018-08-21 RX ORDER — CYCLOBENZAPRINE HCL 10 MG
10 TABLET ORAL DAILY PRN
Qty: 90 TABLET | Refills: 1 | Status: SHIPPED | OUTPATIENT
Start: 2018-08-21 | End: 2019-08-27 | Stop reason: SDUPTHER

## 2018-08-21 RX ORDER — MULTIVIT-MIN/IRON FUM/FOLIC AC 7.5 MG-4
1 TABLET ORAL DAILY
COMMUNITY

## 2018-08-21 NOTE — PROGRESS NOTES
Chief Complaint   Patient presents with    Physical Exam     Annual physical     Health Maintenance   Topic Date Due    HIV SCREENING  1960    Hepatitis C Screening  1960    INFLUENZA VACCINE  09/01/2018    Depression Screening PHQ-9  08/21/2019    DTaP,Tdap,and Td Vaccines (2 - Td) 05/24/2026     Assessment/Plan:         Diagnoses and all orders for this visit:    Encounter for preventive care    Acne vulgaris  -     minocycline (MINOCIN) 50 mg capsule; Take 1 capsule (50 mg total) by mouth daily for 90 days    Chronic bilateral low back pain without sciatica  -     cyclobenzaprine (FLEXERIL) 10 mg tablet; Take 1 tablet (10 mg total) by mouth daily as needed for muscle spasms for up to 90 days    Other orders  -     Multiple Vitamins-Minerals (MULTIVITAMIN WITH MINERALS) tablet; Take 1 tablet by mouth daily          Subjective:      Patient ID: Henri Garcia is a 62 y o  male  Assessment/Plan    Healthy male exam     1  Give refill of flexeril  SE educated pt  Do not take it with alcohol  Give refill of minocycline  SE educated pt  2  Patient Counseling:  --Nutrition: Stressed importance of moderation in sodium/caffeine intake, saturated fat and cholesterol, caloric balance, sufficient intake of fresh fruits, vegetables, fiber, calcium  --Exercise: Stressed the importance of regular exercise  --Substance Abuse: Discussed cessation/primary prevention of tobacco, alcohol, or other drug use; driving or other dangerous activities under the influence  --Injury prevention: Discussed safety belts, safety helmets, smoke detector, smoking near bedding or upholstery  --Dental health: Discussed importance of regular tooth brushing, flossing, and dental visits  --Immunizations reviewed  Got flu shot yearly  Got Tdap, zostavax and pneumovax  Will ask insurance for coverage of shingrix  --Discussed benefits of screening colonoscopy  4/2017, repeat in 5 years     --Discussed risks and benefits of PSA  2/2018 PSA 0 7 normal    --After hours service discussed with patient    3  Discussed the patient's BMI with him  The BMI is in the acceptable range  4  Follow up in 6 months  Pt would like to check labs with preventive care yearly only  Will do labs next time in 8/2019 with preventive care  Kim Blank is a 62 y o  male and is here for a comprehensive physical exam  The patient reports no problems  He uses flexeril at night as needed for lower back pain  Needs refill  He uses minocycline for acne  Needs refill  Eat healthy  No Exercise  A lot of physical work per pt  Cigar 2/week  Alcohol 2 drinks of light beer /3 days  No drugs  FU dentist regularly  No new vision /hearing problems  Do you take any herbs or supplements that were not prescribed by a doctor? yes  Are you taking calcium supplements? no  Are you taking aspirin daily? no        The following portions of the patient's history were reviewed and updated as appropriate: allergies, current medications, past family history, past medical history, past social history, past surgical history and problem list     Review of Systems  Do you have pain that bothers you in your daily life? yes  Pertinent items are noted in HPI      Objective    /78 (BP Location: Left arm, Patient Position: Sitting, Cuff Size: Adult)   Pulse 76   Temp (!) 97 4 °F (36 3 °C) (Oral)   Resp 16   Ht 5' 10" (1 778 m)   Wt 66 2 kg (146 lb)   SpO2 97%   BMI 20 95 kg/m²     General Appearance:    Alert, cooperative, no distress, appears stated age  Head:    Normocephalic, without obvious abnormality, atraumatic  Eyes:    PERRL, conjunctiva/corneas clear, EOM's intact, fundi     benign, both eyes       Ears:    Normal TM's and external ear canals, both ears  Nose:   Nares normal, septum midline, mucosa normal, no drainage    or sinus tenderness  Throat:   Lips, mucosa, and tongue normal; teeth and gums normal  Neck: Supple, symmetrical, trachea midline, no adenopathy;        thyroid:  No enlargement/tenderness/nodules; no carotid    bruit or JVD  Lungs:     Clear to auscultation bilaterally, respirations unlabored  Heart:    Regular rate and rhythm, S1 and S2 normal, no murmur, rub   or gallop  Abdomen:     Soft, non-tender, bowel sounds active all four quadrants,     no masses, no organomegaly  Extremities:   Extremities normal, atraumatic, no cyanosis or edema              The following portions of the patient's history were reviewed and updated as appropriate: allergies, current medications, past family history, past medical history, past social history, past surgical history and problem list     Review of Systems      Objective:      /78 (BP Location: Left arm, Patient Position: Sitting, Cuff Size: Adult)   Pulse 76   Temp (!) 97 4 °F (36 3 °C) (Oral)   Resp 16   Ht 5' 10" (1 778 m)   Wt 66 2 kg (146 lb)   SpO2 97%   BMI 20 95 kg/m²          Physical Exam

## 2019-01-15 ENCOUNTER — TELEPHONE (OUTPATIENT)
Dept: FAMILY MEDICINE CLINIC | Facility: CLINIC | Age: 59
End: 2019-01-15

## 2019-01-15 DIAGNOSIS — L29.9 ITCHY SKIN: Primary | ICD-10-CM

## 2019-01-15 RX ORDER — HYDROCORTISONE 25 MG/ML
LOTION TOPICAL 2 TIMES DAILY
Qty: 118 ML | Refills: 0 | Status: SHIPPED | OUTPATIENT
Start: 2019-01-15

## 2019-01-15 NOTE — TELEPHONE ENCOUNTER
I will send hydrocortisone 2 5% to his pharmacy for his itchy skin, only use as needed  He should also use body lotion or Vaseline ointment twice per day and humidifier in his room

## 2019-02-21 ENCOUNTER — OFFICE VISIT (OUTPATIENT)
Dept: FAMILY MEDICINE CLINIC | Facility: CLINIC | Age: 59
End: 2019-02-21
Payer: COMMERCIAL

## 2019-02-21 VITALS
RESPIRATION RATE: 16 BRPM | SYSTOLIC BLOOD PRESSURE: 125 MMHG | HEART RATE: 92 BPM | TEMPERATURE: 97.6 F | WEIGHT: 145.8 LBS | BODY MASS INDEX: 20.87 KG/M2 | DIASTOLIC BLOOD PRESSURE: 88 MMHG | HEIGHT: 70 IN | OXYGEN SATURATION: 98 %

## 2019-02-21 DIAGNOSIS — G47.00 INSOMNIA, UNSPECIFIED TYPE: ICD-10-CM

## 2019-02-21 DIAGNOSIS — M54.50 CHRONIC BILATERAL LOW BACK PAIN WITHOUT SCIATICA: ICD-10-CM

## 2019-02-21 DIAGNOSIS — Z12.5 SCREENING FOR PROSTATE CANCER: ICD-10-CM

## 2019-02-21 DIAGNOSIS — F41.9 ANXIETY: ICD-10-CM

## 2019-02-21 DIAGNOSIS — G89.29 CHRONIC BILATERAL LOW BACK PAIN WITHOUT SCIATICA: ICD-10-CM

## 2019-02-21 DIAGNOSIS — E78.2 MIXED HYPERLIPIDEMIA: ICD-10-CM

## 2019-02-21 DIAGNOSIS — R73.01 IFG (IMPAIRED FASTING GLUCOSE): Primary | ICD-10-CM

## 2019-02-21 PROCEDURE — 99214 OFFICE O/P EST MOD 30 MIN: CPT | Performed by: FAMILY MEDICINE

## 2019-02-21 PROCEDURE — 3008F BODY MASS INDEX DOCD: CPT | Performed by: FAMILY MEDICINE

## 2019-02-21 PROCEDURE — 1036F TOBACCO NON-USER: CPT | Performed by: FAMILY MEDICINE

## 2019-02-21 NOTE — PROGRESS NOTES
Chief Complaint   Patient presents with    Follow-up     6 month follow up  Health Maintenance   Topic Date Due    Hepatitis C Screening  1960    INFLUENZA VACCINE  07/01/2018    Depression Screening PHQ  08/21/2019    BMI: Adult  08/21/2019    CRC Screening: Colonoscopy  04/17/2022    DTaP,Tdap,and Td Vaccines (2 - Td) 05/24/2026    HEPATITIS B VACCINES  Aged Out   Assessment/Plan:  Lose weight---When I compare his wt from last year, he lost 4 labs  Will check labs  IFG---Low carb diet  Check labs  Hyperlipidemia---low fat diet  Check labs  Anxiety/insomnia---use xanax as needed  SE educated pt  Chronic lower back pain/spasm---continue flexeril  SE educated pt  Discussed at length re interaction and issues with benzodiazepines and muscle relaxant, he understood the risk  Educated pt do not take muscle relaxer and benzo at the same time  I discussed the risks of taking these medications together  Pt understood        Got flu shot this season  Will check insurance for coverage of Tdap and shingrix  Colonoscopy 4/2017, repeat in 5 years  PSA yearly  Pros and cons educated pt  RTO in 6 months with physical exam         Diagnoses and all orders for this visit:    IFG (impaired fasting glucose)  -     CBC and differential; Future  -     Comprehensive metabolic panel; Future  -     Hemoglobin A1C; Future  -     Lipid Panel with Direct LDL reflex; Future  -     TSH, 3rd generation with Free T4 reflex; Future    Mixed hyperlipidemia  -     CBC and differential; Future  -     Comprehensive metabolic panel; Future  -     Hemoglobin A1C; Future  -     Lipid Panel with Direct LDL reflex; Future  -     TSH, 3rd generation with Free T4 reflex; Future    Anxiety  -     CBC and differential; Future  -     Comprehensive metabolic panel; Future  -     Hemoglobin A1C; Future  -     Lipid Panel with Direct LDL reflex; Future  -     TSH, 3rd generation with Free T4 reflex;  Future    Insomnia, unspecified type  -     CBC and differential; Future  -     Comprehensive metabolic panel; Future  -     Hemoglobin A1C; Future  -     Lipid Panel with Direct LDL reflex; Future  -     TSH, 3rd generation with Free T4 reflex; Future    Chronic bilateral low back pain without sciatica    Screening for prostate cancer  -     PSA, total and free; Future    Other orders  -     Cancel: PREFERRED: influenza vaccine, [id number], quadrivalent, recombinant, PF, 0 5 mL, for patients 18 yr+ (FLUBLOK); Future          Subjective:      Patient ID: Royce Alonzo is a 62 y o  male  HPI    Pt is here by himself  Concern lose weight for 1 year  Would like to check TSH  Appetite is good  Eat healthy  Denies nausea, vomiting or abdominal pain  Exercise regularly  Sleep 8 hours during morning  He works 6pm-4am at [employer]  Hyperlipidemia---Diet control  Not on any meds  IFG---Diet control  Not on any meds  Anxiety/insomnia---use xanax 0 25mg before work which helped, 3-4/week  A lot of stress in his job  Denies depression        Chronic lower back pain/neck pain---It involved a lot of physical work at work  Feels muscle spasm after work  Muscle relaxant helped      Denies smoking  Quit smoking 20 years  Smoked 0 5ppd for 20 years  Alcohol socially  No drugs  The following portions of the patient's history were reviewed and updated as appropriate: allergies, current medications, past family history, past medical history, past social history, past surgical history and problem list     Review of Systems   Constitutional: Negative for appetite change, chills and fever  HENT: Negative for congestion, ear pain, sinus pain and sore throat  Eyes: Negative for discharge and itching  Respiratory: Negative for apnea, cough, chest tightness, shortness of breath and wheezing  Cardiovascular: Negative for chest pain, palpitations and leg swelling     Gastrointestinal: Negative for abdominal pain, anal bleeding, constipation, diarrhea, nausea and vomiting  Endocrine: Negative for cold intolerance, heat intolerance and polyuria  Genitourinary: Negative for difficulty urinating and dysuria  Musculoskeletal: Negative for arthralgias, back pain and myalgias  Skin: Negative for rash  Neurological: Negative for dizziness and headaches  Psychiatric/Behavioral: Negative for agitation  Objective:      /88 (BP Location: Left arm, Patient Position: Sitting, Cuff Size: Standard)   Pulse 92   Temp 97 6 °F (36 4 °C) (Oral)   Resp 16   Ht 5' 10" (1 778 m)   Wt 66 1 kg (145 lb 12 8 oz)   SpO2 98%   BMI 20 92 kg/m²          Physical Exam   Constitutional: He appears well-developed  HENT:   Head: Normocephalic and atraumatic  Eyes: Pupils are equal, round, and reactive to light  Conjunctivae are normal    Neck: Normal range of motion  Neck supple  Cardiovascular: Normal rate, regular rhythm, normal heart sounds and intact distal pulses  Pulmonary/Chest: Effort normal and breath sounds normal    Abdominal: Soft  Bowel sounds are normal    Musculoskeletal: Normal range of motion  Neurological: He is alert

## 2019-03-02 LAB
ALBUMIN SERPL-MCNC: 4.4 G/DL (ref 3.6–5.1)
ALBUMIN/GLOB SERPL: 1.8 (CALC) (ref 1–2.5)
ALP SERPL-CCNC: 53 U/L (ref 40–115)
ALT SERPL-CCNC: 20 U/L (ref 9–46)
AST SERPL-CCNC: 21 U/L (ref 10–35)
BASOPHILS # BLD AUTO: 60 CELLS/UL (ref 0–200)
BASOPHILS NFR BLD AUTO: 1 %
BILIRUB SERPL-MCNC: 0.7 MG/DL (ref 0.2–1.2)
BUN SERPL-MCNC: 18 MG/DL (ref 7–25)
BUN/CREAT SERPL: NORMAL (CALC) (ref 6–22)
CALCIUM SERPL-MCNC: 9.6 MG/DL (ref 8.6–10.3)
CHLORIDE SERPL-SCNC: 102 MMOL/L (ref 98–110)
CHOLEST SERPL-MCNC: 217 MG/DL
CHOLEST/HDLC SERPL: 2.9 (CALC)
CO2 SERPL-SCNC: 30 MMOL/L (ref 20–32)
CREAT SERPL-MCNC: 0.88 MG/DL (ref 0.7–1.33)
EOSINOPHIL # BLD AUTO: 384 CELLS/UL (ref 15–500)
EOSINOPHIL NFR BLD AUTO: 6.4 %
ERYTHROCYTE [DISTWIDTH] IN BLOOD BY AUTOMATED COUNT: 12.2 % (ref 11–15)
GLOBULIN SER CALC-MCNC: 2.4 G/DL (CALC) (ref 1.9–3.7)
GLUCOSE SERPL-MCNC: 110 MG/DL (ref 65–139)
HCT VFR BLD AUTO: 43.4 % (ref 38.5–50)
HDLC SERPL-MCNC: 76 MG/DL
HGB BLD-MCNC: 14.7 G/DL (ref 13.2–17.1)
LDLC SERPL CALC-MCNC: 126 MG/DL (CALC)
LYMPHOCYTES # BLD AUTO: 1656 CELLS/UL (ref 850–3900)
LYMPHOCYTES NFR BLD AUTO: 27.6 %
MCH RBC QN AUTO: 30.4 PG (ref 27–33)
MCHC RBC AUTO-ENTMCNC: 33.9 G/DL (ref 32–36)
MCV RBC AUTO: 89.9 FL (ref 80–100)
MONOCYTES # BLD AUTO: 576 CELLS/UL (ref 200–950)
MONOCYTES NFR BLD AUTO: 9.6 %
NEUTROPHILS # BLD AUTO: 3324 CELLS/UL (ref 1500–7800)
NEUTROPHILS NFR BLD AUTO: 55.4 %
NONHDLC SERPL-MCNC: 141 MG/DL (CALC)
PLATELET # BLD AUTO: 249 THOUSAND/UL (ref 140–400)
PMV BLD REES-ECKER: 9.9 FL (ref 7.5–12.5)
POTASSIUM SERPL-SCNC: 4.4 MMOL/L (ref 3.5–5.3)
PROT SERPL-MCNC: 6.8 G/DL (ref 6.1–8.1)
RBC # BLD AUTO: 4.83 MILLION/UL (ref 4.2–5.8)
SL AMB EGFR AFRICAN AMERICAN: 110 ML/MIN/1.73M2
SL AMB EGFR NON AFRICAN AMERICAN: 95 ML/MIN/1.73M2
SODIUM SERPL-SCNC: 139 MMOL/L (ref 135–146)
TRIGL SERPL-MCNC: 62 MG/DL
TSH SERPL-ACNC: 1.28 MIU/L (ref 0.4–4.5)
WBC # BLD AUTO: 6 THOUSAND/UL (ref 3.8–10.8)

## 2019-08-27 ENCOUNTER — OFFICE VISIT (OUTPATIENT)
Dept: FAMILY MEDICINE CLINIC | Facility: CLINIC | Age: 59
End: 2019-08-27
Payer: COMMERCIAL

## 2019-08-27 VITALS
SYSTOLIC BLOOD PRESSURE: 118 MMHG | BODY MASS INDEX: 22.01 KG/M2 | DIASTOLIC BLOOD PRESSURE: 72 MMHG | WEIGHT: 148.6 LBS | HEIGHT: 69 IN | HEART RATE: 74 BPM | TEMPERATURE: 98.5 F | RESPIRATION RATE: 15 BRPM

## 2019-08-27 DIAGNOSIS — Z11.59 NEED FOR HEPATITIS C SCREENING TEST: ICD-10-CM

## 2019-08-27 DIAGNOSIS — G89.29 CHRONIC BILATERAL LOW BACK PAIN WITHOUT SCIATICA: ICD-10-CM

## 2019-08-27 DIAGNOSIS — R73.01 IFG (IMPAIRED FASTING GLUCOSE): ICD-10-CM

## 2019-08-27 DIAGNOSIS — M54.50 CHRONIC BILATERAL LOW BACK PAIN WITHOUT SCIATICA: ICD-10-CM

## 2019-08-27 DIAGNOSIS — Z00.00 ENCOUNTER FOR PREVENTIVE CARE: Primary | ICD-10-CM

## 2019-08-27 PROCEDURE — 99396 PREV VISIT EST AGE 40-64: CPT | Performed by: FAMILY MEDICINE

## 2019-08-27 RX ORDER — KETOCONAZOLE 20 MG/G
CREAM TOPICAL DAILY
COMMUNITY

## 2019-08-27 RX ORDER — CYCLOBENZAPRINE HCL 10 MG
10 TABLET ORAL DAILY PRN
Qty: 90 TABLET | Refills: 1 | Status: SHIPPED | OUTPATIENT
Start: 2019-08-27 | End: 2022-02-16 | Stop reason: SDUPTHER

## 2019-08-27 NOTE — PROGRESS NOTES
Chief Complaint   Patient presents with    Physical Exam     Preventative  Health Maintenance   Topic Date Due    Hepatitis C Screening  1960    INFLUENZA VACCINE  2019    Depression Screening PHQ  2020    BMI: Adult  2020    CRC Screening: Colonoscopy  2022    Pneumococcal Vaccine: 65+ Years (1 of 2 - PCV13) 2025    DTaP,Tdap,and Td Vaccines (2 - Td) 2026    Pneumococcal Vaccine: Pediatrics (0 to 5 Years) and At-Risk Patients (6 to 59 Years)  Aged Out    HEPATITIS B VACCINES  Aged Out     Assessment/Plan:    AR---ear block possible from nasal swollen  Recommend pt use OTC flonase  SE educated pt  Educated pt correct way to use it  Lower back pain---give refills of flexeril; SE educated pt  Do not use it with xanax  Pt understood  Flu shot yearly  Got Tdap   Got shingrix   Colonoscopy 2017, repeat in 5 years  PSA yearly  RTO in 6 months  Diagnoses and all orders for this visit:    Encounter for preventive care    Need for hepatitis C screening test  -     Hepatitis C antibody; Future    IFG (impaired fasting glucose)  -     Hemoglobin A1C With EAG; Future    Chronic bilateral low back pain without sciatica  -     cyclobenzaprine (FLEXERIL) 10 mg tablet; Take 1 tablet (10 mg total) by mouth daily as needed for muscle spasms for up to 90 days    Other orders  -     ketoconazole (NIZORAL) 2 % cream; Apply topically daily          Subjective:      Patient ID: Rafi Muller is a 62 y o  male  ADULT ANNUAL PHYSICAL  [facility] Physician [facility]    NAME: Rafi Muller  AGE: 62 y o  SEX: male  : 1960     DATE: 2019     Assessment and Plan:         Immunizations and preventive care screenings were discussed with patient today  Appropriate education was printed on patient's after visit summary      Counseling:  Dental Health: discussed importance of regular tooth brushing, flossing, and dental visits  Injury prevention: discussed safety/seat belts, safety helmets, smoke detectors, carbon dioxide detectors, and smoking near bedding or upholstery  Sexual health: discussed sexually transmitted diseases, partner selection, use of condoms, avoidance of unintended pregnancy, and contraceptive alternatives  Chief Complaint:    Patient presents with:  Physical Exam: Preventative  History of Present Illness:    Adult Annual Physical   Patient here for a comprehensive physical exam  The patient reports right ear blocked for 2 weeks  No pain  Some nasal congestion  Denies fever, SOB, cP, n/v/abd pain  Lower back pain---A lot of physical job at his work  Use flexeril 0 5 tab qhs as needed, possible 4/week  Diet and Physical Activity  Diet/Nutrition: well balanced diet  Exercise: no formal exercise  Depression Screening  PHQ-9 Depression Screening    PHQ-9:  0  Frequency of the following problems over the past two weeks:  0        General Health  Sleep: sleeps well and gets 7-8 hours of sleep on average  Hearing: normal - bilateral   Vision: no vision problems  Dental: regular dental visits   Health  Symptoms include: none     Review of Systems:    @[employer]@   Past Medical History:    Past Medical History:  No date: Anemia      Comment:  LAST ASSESSED: 6/28/17  No date: Anxiety  No date: Arthritis  No date: High cholesterol  No date: Rectal bleed  No date: Varicella   Past Surgical History:    Past Surgical History:  No date: COLONOSCOPY  4/17/2017: ESOPHAGOGASTRODUODENOSCOPY; N/A      Comment:  Procedure: ESOPHAGOGASTRODUODENOSCOPY (EGD); Surgeon:                Ryan Valverde MD;  [facility]; Service:   No date: HERNIA REPAIR  4/17/2017: DC COLONOSCOPY FLX DX W/COLLJ SPEC WHEN PFRMD; N/A      Comment:  Procedure: COLONOSCOPY;  Surgeon: Ryan Valverde MD;               Location: [facility];   Service: Colorectal   Family History:    Review of patient's family history indicates:  Problem: Colon cancer      Relation: Mother          Age of Onset: (Not Specified)  Problem: Diabetes      Relation: Mother          Age of Onset: (Not Specified)  Problem: Heart disease      Relation: Father          Age of Onset: (Not Specified)     Social History:    Social History    Socioeconomic History      Marital status: Single      Spouse name: Not on file      Number of children: Not on file      Years of education: Not on file      Highest education level: Not on file    Occupational History      Not on file    Social Needs      Financial resource strain: Not on file      Food insecurity:        Worry: Not on file        Inability: Not on file      Transportation needs:        Medical: Not on file        Non-medical: Not on file    Tobacco Use      Smoking status: Former Smoker      Smokeless tobacco: Never Used      Tobacco comment: smokes cigars    Substance and Sexual Activity      Alcohol use:  Yes        Alcohol/week: 3 0 standard drinks        Types: 3 Cans of beer per week      Drug use: No      Sexual activity: Not on file    Lifestyle      Physical activity:        Days per week: Not on file        Minutes per session: Not on file      Stress: Not on file    Relationships      Social connections:        Talks on phone: Not on file        Gets together: Not on file        Attends Church service: Not on file        Active member of club or organization: Not on file        Attends meetings of clubs or organizations: Not on file        Relationship status: Not on file      Intimate partner violence:        Fear of current or ex partner: Not on file        Emotionally abused: Not on file        Physically abused: Not on file        Forced sexual activity: Not on file    Other Topics      Concerns:        Not on file    Social History Narrative      Not on file     Current Medications:    Current Outpatient Medications:  ALPRAZolam (XANAX) 0 25 mg tablet, Take 1 tablet (0 25 mg total) by mouth daily at bedtime as needed for anxiety or sleep for up to 90 days, Disp: 90 tablet, Rfl: 0  cyclobenzaprine (FLEXERIL) 10 mg tablet, Take 1 tablet (10 mg total) by mouth daily as needed for muscle spasms for up to 90 days, Disp: 90 tablet, Rfl: 1  hydrocortisone 2 5 % lotion, Apply topically 2 (two) times a day, Disp: 118 mL, Rfl: 0  ketoconazole (NIZORAL) 2 % cream, Apply topically daily, Disp: , Rfl:   Multiple Vitamins-Minerals (MULTIVITAMIN WITH MINERALS) tablet, Take 1 tablet by mouth daily, Disp: , Rfl:   ascorbic acid (VITAMIN C) 500 mg tablet, Take 500 mg by mouth daily, Disp: , Rfl:   vitamin E 100 UNIT capsule, Take 100 Units by mouth every other day, Disp: , Rfl:     No current facility-administered medications for this visit  Allergies:    No Known Allergies   Physical Exam:    /72 (BP Location: Left arm, Patient Position: Sitting, Cuff Size: Adult)   Pulse 74   Temp 98 5 °F (36 9 °C) (Tympanic)   Resp 15   Ht 5' 9" (1 753 m)   Wt 67 4 kg (148 lb 9 6 oz)   BMI 21 94 kg/m²     [unfilled]    Amaris Elizabeth MD  [address] [place] [address] Drive        The following portions of the patient's history were reviewed and updated as appropriate: allergies, current medications, past family history, past medical history, past social history, past surgical history and problem list     Review of Systems   Constitutional: Negative for appetite change, chills and fever  HENT: Positive for congestion  Negative for ear pain, sinus pain and sore throat  Eyes: Negative for discharge and itching  Respiratory: Negative for apnea, cough, chest tightness, shortness of breath and wheezing  Cardiovascular: Negative for chest pain, palpitations and leg swelling  Gastrointestinal: Negative for abdominal pain, anal bleeding, constipation, diarrhea, nausea and vomiting  Endocrine: Negative for cold intolerance, heat intolerance and polyuria     Genitourinary: Negative for difficulty urinating and dysuria  Musculoskeletal: Negative for arthralgias, back pain and myalgias  Skin: Negative for rash  Neurological: Negative for dizziness and headaches  Psychiatric/Behavioral: Negative for agitation  Objective:      /72 (BP Location: Left arm, Patient Position: Sitting, Cuff Size: Adult)   Pulse 74   Temp 98 5 °F (36 9 °C) (Tympanic)   Resp 15   Ht 5' 9" (1 753 m)   Wt 67 4 kg (148 lb 9 6 oz)   BMI 21 94 kg/m²          Physical Exam   Constitutional: He appears well-developed  HENT:   Head: Normocephalic and atraumatic  Right Ear: External ear normal    Left Ear: External ear normal    Mouth/Throat: Oropharynx is clear and moist    B/l nasal swollen   Eyes: Pupils are equal, round, and reactive to light  Conjunctivae are normal    Neck: Normal range of motion  Neck supple  Cardiovascular: Normal rate, regular rhythm, normal heart sounds and intact distal pulses  Exam reveals no gallop and no friction rub  No murmur heard  Pulmonary/Chest: Effort normal and breath sounds normal  No stridor  No respiratory distress  He has no wheezes  He has no rales  He exhibits no tenderness  Abdominal: Soft  Bowel sounds are normal    Musculoskeletal: Normal range of motion  He exhibits no edema  Neurological: He is alert

## 2019-09-20 LAB
EST. AVERAGE GLUCOSE BLD GHB EST-MCNC: 114 (CALC)
EST. AVERAGE GLUCOSE BLD GHB EST-SCNC: 6.3 (CALC)
HBA1C MFR BLD: 5.6 % OF TOTAL HGB
HCV AB S/CO SERPL IA: 0.01
HCV AB SERPL QL IA: NORMAL

## 2022-02-16 ENCOUNTER — OFFICE VISIT (OUTPATIENT)
Dept: FAMILY MEDICINE CLINIC | Facility: CLINIC | Age: 62
End: 2022-02-16
Payer: COMMERCIAL

## 2022-02-16 VITALS
HEART RATE: 100 BPM | SYSTOLIC BLOOD PRESSURE: 130 MMHG | RESPIRATION RATE: 20 BRPM | BODY MASS INDEX: 20.91 KG/M2 | WEIGHT: 141.2 LBS | OXYGEN SATURATION: 99 % | HEIGHT: 69 IN | DIASTOLIC BLOOD PRESSURE: 80 MMHG | TEMPERATURE: 98.9 F

## 2022-02-16 DIAGNOSIS — Z00.00 ANNUAL PHYSICAL EXAM: Primary | ICD-10-CM

## 2022-02-16 DIAGNOSIS — R73.01 IFG (IMPAIRED FASTING GLUCOSE): ICD-10-CM

## 2022-02-16 DIAGNOSIS — Z12.12 SCREENING FOR COLORECTAL CANCER: ICD-10-CM

## 2022-02-16 DIAGNOSIS — E78.2 MIXED HYPERLIPIDEMIA: ICD-10-CM

## 2022-02-16 DIAGNOSIS — Z11.4 SCREENING FOR HIV (HUMAN IMMUNODEFICIENCY VIRUS): ICD-10-CM

## 2022-02-16 DIAGNOSIS — M77.8 TENDONITIS OF ELBOW, LEFT: ICD-10-CM

## 2022-02-16 DIAGNOSIS — Z12.11 SCREENING FOR COLORECTAL CANCER: ICD-10-CM

## 2022-02-16 DIAGNOSIS — M54.50 CHRONIC BILATERAL LOW BACK PAIN WITHOUT SCIATICA: ICD-10-CM

## 2022-02-16 DIAGNOSIS — F41.9 ANXIETY: ICD-10-CM

## 2022-02-16 DIAGNOSIS — H61.22 IMPACTED CERUMEN OF LEFT EAR: ICD-10-CM

## 2022-02-16 DIAGNOSIS — Z12.5 SCREENING FOR PROSTATE CANCER: ICD-10-CM

## 2022-02-16 DIAGNOSIS — G89.29 CHRONIC BILATERAL LOW BACK PAIN WITHOUT SCIATICA: ICD-10-CM

## 2022-02-16 DIAGNOSIS — L30.9 DERMATITIS: ICD-10-CM

## 2022-02-16 PROCEDURE — 3725F SCREEN DEPRESSION PERFORMED: CPT | Performed by: FAMILY MEDICINE

## 2022-02-16 PROCEDURE — 1036F TOBACCO NON-USER: CPT | Performed by: FAMILY MEDICINE

## 2022-02-16 PROCEDURE — 99396 PREV VISIT EST AGE 40-64: CPT | Performed by: FAMILY MEDICINE

## 2022-02-16 PROCEDURE — 3008F BODY MASS INDEX DOCD: CPT | Performed by: FAMILY MEDICINE

## 2022-02-16 RX ORDER — ALPRAZOLAM 0.25 MG/1
0.25 TABLET ORAL
Qty: 30 TABLET | Refills: 0 | Status: SHIPPED | OUTPATIENT
Start: 2022-02-16 | End: 2022-04-19 | Stop reason: SDUPTHER

## 2022-02-16 RX ORDER — CYCLOBENZAPRINE HCL 10 MG
10 TABLET ORAL DAILY PRN
Qty: 90 TABLET | Refills: 1 | Status: SHIPPED | OUTPATIENT
Start: 2022-02-16 | End: 2022-05-27

## 2022-02-16 NOTE — PROGRESS NOTES
[address] [facility]    NAME: Rosibel Hall  AGE: 64 y o  SEX: male  : 1960     DATE: 2022     Assessment and Plan:     Problem List Items Addressed This Visit        Endocrine    IFG (impaired fasting glucose)    Relevant Orders    Comprehensive metabolic panel    Hemoglobin A1C    Lipid panel       Other    Anxiety     Use xanax 0 25mg on weekends for sleep  SE educated pt  Relevant Medications    ALPRAZolam (XANAX) 0 25 mg tablet    Chronic lower back pain     Use flexeril 10mg qhs  SE educated pt  Relevant Medications    cyclobenzaprine (FLEXERIL) 10 mg tablet    Hyperlipidemia    Relevant Orders    Comprehensive metabolic panel    Hemoglobin A1C    Lipid panel    Screening for prostate cancer    Relevant Orders    Comprehensive metabolic panel    PSA, Total Screen      Other Visit Diagnoses     Annual physical exam    -  Primary    Relevant Orders    Comprehensive metabolic panel    PSA, Total Screen    Screening for HIV (human immunodeficiency virus)        Relevant Orders    HIV 1/2 Antigen/Antibody (4th Generation) w Reflex [facility]    Screening for colorectal cancer        Relevant Orders    Ambulatory referral for Colonoscopy    Dermatitis        Refer to allergist for 2nd opinion  Relevant Orders    Ambulatory Referral to Allergy    Tendonitis of elbow, left        Pt refused PT today  Impacted cerumen of left ear            Ceruminosis is noted  Wax is removed by syringing and manual debridement  Instructions for home care to prevent wax buildup are given  Got Flu shot this year  Got covid19 shots and booster  PSA yearly  Pros and cons educated pt  Colonoscopy 2017, due in 5 years  Give referral        Immunizations and preventive care screenings were discussed with patient today  Appropriate education was printed on patient's after visit summary      Counseling:  Alcohol/drug use: discussed moderation in alcohol intake, the recommendations for healthy alcohol use, and avoidance of illicit drug use  Dental Health: discussed importance of regular tooth brushing, flossing, and dental visits  Injury prevention: discussed safety/seat belts, safety helmets, smoke detectors, carbon dioxide detectors, and smoking near bedding or upholstery  Sexual health: discussed sexually transmitted diseases, partner selection, use of condoms, avoidance of unintended pregnancy, and contraceptive alternatives  · Exercise: the importance of regular exercise/physical activity was discussed  Recommend exercise 3-5 times per week for at least 30 minutes  Return in about 6 months (around 8/16/2022)  Chief Complaint:     Chief Complaint   Patient presents with    Physical Exam     Preventative   Ear Problem     Pressure in left ear   Rash     Arms and back  Health Maintenance   Topic Date Due    Depression Follow-up Plan  Never done    HIV Screening  Never done    Annual Physical  08/27/2020    Influenza Vaccine (1) 09/01/2021    Colorectal Cancer Screening  04/17/2022    Depression Screening  02/16/2023    BMI: Adult  02/16/2023    DTaP,Tdap,and Td Vaccines (2 - Td or Tdap) 05/24/2026    Hepatitis C Screening  Completed    COVID-19 Vaccine  Completed    Pneumococcal Vaccine: Pediatrics (0 to 5 Years) and At-Risk Patients (6 to 59 Years)  Aged Out    HIB Vaccine  Aged Out    Hepatitis B Vaccine  Aged Out    IPV Vaccine  Aged Out    Hepatitis A Vaccine  Aged Out    Meningococcal ACWY Vaccine  Aged Out    HPV Vaccine  Aged Out      History of Present Illness:     Adult Annual Physical   Patient here for a comprehensive physical exam  The patient reports see note  Rash on arms and back and legs  Saw dermatology recently  Plan to do patch test    Would like 2nd opinion  Left elbow pain for 4 months  Worse after lifting  Sometimes hands locked       Pt had Life screening done on 2022  Lipid 234/76/89/130 elevated  Glucose 92 normal  TSH 2 21 normal  AAA screen normal  PAD screen normal      Cigar 2/week  Beers on weekends  3 beer/night  No drugs  Diet and Physical Activity  · Diet/Nutrition: well balanced diet  · Exercise: walking  Depression Screening  PHQ-2/9 Depression Screening    Little interest or pleasure in doing things: 3 - nearly every day  Feeling down, depressed, or hopeless: 3 - nearly every day  Trouble falling or staying asleep, or sleeping too much: 1 - several days  Feeling tired or having little energy: 3 - nearly every day  Poor appetite or overeatin - not at all  Feeling bad about yourself - or that you are a failure or have let yourself or your family down: 0 - not at all  Trouble concentrating on things, such as reading the newspaper or watching television: 0 - not at all  Moving or speaking so slowly that other people could have noticed  Or the opposite - being so fidgety or restless that you have been moving around a lot more than usual: 0 - not at all  Thoughts that you would be better off dead, or of hurting yourself in some way: 0 - not at all  PHQ-2 Score: 6  PHQ-2 Interpretation: POSITIVE depression screen  PHQ-9 Score: 10   PHQ-9 Interpretation: Moderate depression        General Health  · Sleep: sleeps well  · Hearing: decreased - left  · Vision: wears glasses  · Dental: regular dental visits   Health  · Symptoms include: none     Review of Systems:     Review of Systems   Constitutional: Negative for appetite change, chills and fever  HENT: Positive for hearing loss  Negative for congestion, ear pain, sinus pain and sore throat  Eyes: Negative for discharge and itching  Respiratory: Negative for apnea, cough, chest tightness, shortness of breath and wheezing  Cardiovascular: Negative for chest pain, palpitations and leg swelling     Gastrointestinal: Negative for abdominal pain, anal bleeding, constipation, diarrhea, nausea and vomiting  Endocrine: Negative for cold intolerance, heat intolerance and polyuria  Genitourinary: Negative for difficulty urinating and dysuria  Musculoskeletal: Positive for arthralgias  Negative for back pain and myalgias  Skin: Positive for rash  Neurological: Negative for dizziness and headaches  Psychiatric/Behavioral: Negative for agitation  Past Medical History:     Past Medical History:   Diagnosis Date    Anemia     LAST ASSESSED: 6/28/17    Anxiety     Arthritis     High cholesterol     Rectal bleed     Varicella       Past Surgical History:     Past Surgical History:   Procedure Laterality Date    COLONOSCOPY      ESOPHAGOGASTRODUODENOSCOPY N/A 4/17/2017    Procedure: ESOPHAGOGASTRODUODENOSCOPY (EGD); Surgeon: Kaiser Hurtado MD;  [facility]; Service:     HERNIA REPAIR      FL COLONOSCOPY FLX DX W/COLLJ SPEC WHEN PFRMD N/A 4/17/2017    Procedure: COLONOSCOPY;  Surgeon: Kaiser Hurtado MD;  [facility]; Service: Colorectal      Family History:     Family History   Problem Relation Age of Onset    Colon cancer Mother     Diabetes Mother     Heart disease Father       Social History:     Social History     Socioeconomic History    Marital status: Single     Spouse name: None    Number of children: None    Years of education: None    Highest education level: None   Occupational History    None   Tobacco Use    Smoking status: Former Smoker    Smokeless tobacco: Never Used    Tobacco comment: smokes cigars   Substance and Sexual Activity    Alcohol use:  Yes     Alcohol/week: 3 0 standard drinks     Types: 3 Cans of beer per week    Drug use: No    Sexual activity: None   Other Topics Concern    None   Social History Narrative    None     Social Determinants of Health     Financial Resource Strain: Not on file   Food Insecurity: Not on file   Transportation Needs: Not on file   Physical Activity: Not on file Stress: Not on file   Social Connections: Not on file   Intimate Partner Violence: Not on file   Housing Stability: Not on file      Current Medications:     Current Outpatient Medications   Medication Sig Dispense Refill    ALPRAZolam (XANAX) 0 25 mg tablet Take 1 tablet (0 25 mg total) by mouth daily at bedtime as needed for anxiety or sleep 30 tablet 0    ascorbic acid (VITAMIN C) 500 mg tablet Take 500 mg by mouth daily      cyclobenzaprine (FLEXERIL) 10 mg tablet Take 1 tablet (10 mg total) by mouth daily as needed for muscle spasms 90 tablet 1    hydrocortisone 2 5 % lotion Apply topically 2 (two) times a day 118 mL 0    ketoconazole (NIZORAL) 2 % cream Apply topically daily      Multiple Vitamins-Minerals (MULTIVITAMIN WITH MINERALS) tablet Take 1 tablet by mouth daily      vitamin E 100 UNIT capsule Take 100 Units by mouth every other day       No current facility-administered medications for this visit  Allergies:     No Known Allergies   Physical Exam:     /80 (BP Location: Left arm, Patient Position: Sitting, Cuff Size: Adult)   Pulse 100   Temp 98 9 °F (37 2 °C) (Tympanic)   Resp 20   Ht 5' 8 5" (1 74 m)   Wt 64 kg (141 lb 3 2 oz)   SpO2 99%   BMI 21 16 kg/m²     Physical Exam  Vitals reviewed  Constitutional:       Appearance: Normal appearance  HENT:      Head: Normocephalic and atraumatic  Right Ear: There is no impacted cerumen  Left Ear: There is impacted cerumen  Eyes:      General:         Right eye: No discharge  Left eye: No discharge  Conjunctiva/sclera: Conjunctivae normal    Neck:      Vascular: No carotid bruit  Cardiovascular:      Rate and Rhythm: Normal rate and regular rhythm  Heart sounds: Normal heart sounds  No murmur heard  No friction rub  No gallop  Pulmonary:      Effort: Pulmonary effort is normal  No respiratory distress  Breath sounds: Normal breath sounds  No wheezing or rales     Abdominal:      General: Bowel sounds are normal  There is no distension  Palpations: Abdomen is soft  Tenderness: There is no abdominal tenderness  Musculoskeletal:         General: Tenderness present  No swelling or deformity  Normal range of motion  Cervical back: Normal range of motion and neck supple  No muscular tenderness  Comments: Left elbow tenderness, no swollen or erythema   Lymphadenopathy:      Cervical: No cervical adenopathy  Skin:     Findings: Rash present  Neurological:      Mental Status: He is alert     Psychiatric:         Mood and Affect: Mood normal           Ulisses Pandey MD  [address] [place] [address]

## 2022-02-16 NOTE — PROGRESS NOTES
Assessment/Plan:    No problem-specific Assessment & Plan notes found for this encounter  {Assess/PlanSmartLinks:33345}      Subjective:      Patient ID: Feng Myesr is a 64 y o  male      HPI          {Common ambulatory SmartLinks:80138}    Review of Systems      Objective:      /80 (BP Location: Left arm, Patient Position: Sitting, Cuff Size: Adult)   Pulse 100   Temp 98 9 °F (37 2 °C) (Tympanic)   Resp 20   Ht 5' 8 5" (1 74 m)   Wt 64 kg (141 lb 3 2 oz)   SpO2 99%   BMI 21 16 kg/m²          Physical Exam

## 2022-02-25 ENCOUNTER — TELEPHONE (OUTPATIENT)
Dept: FAMILY MEDICINE CLINIC | Facility: CLINIC | Age: 62
End: 2022-02-25

## 2022-02-25 NOTE — TELEPHONE ENCOUNTER
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

## 2022-02-26 LAB
ALBUMIN SERPL-MCNC: 4.3 G/DL (ref 3.6–5.1)
ALBUMIN/GLOB SERPL: 1.9 (CALC) (ref 1–2.5)
ALP SERPL-CCNC: 52 U/L (ref 35–144)
ALT SERPL-CCNC: 21 U/L (ref 9–46)
AST SERPL-CCNC: 26 U/L (ref 10–35)
BILIRUB SERPL-MCNC: 0.7 MG/DL (ref 0.2–1.2)
BUN SERPL-MCNC: 17 MG/DL (ref 7–25)
BUN/CREAT SERPL: ABNORMAL (CALC) (ref 6–22)
CALCIUM SERPL-MCNC: 9.6 MG/DL (ref 8.6–10.3)
CHLORIDE SERPL-SCNC: 105 MMOL/L (ref 98–110)
CO2 SERPL-SCNC: 33 MMOL/L (ref 20–32)
CREAT SERPL-MCNC: 0.86 MG/DL (ref 0.7–1.25)
GLOBULIN SER CALC-MCNC: 2.3 G/DL (CALC) (ref 1.9–3.7)
GLUCOSE SERPL-MCNC: 90 MG/DL (ref 65–99)
HIV 1+2 AB+HIV1 P24 AG SERPL QL IA: NORMAL
POTASSIUM SERPL-SCNC: 5 MMOL/L (ref 3.5–5.3)
PROT SERPL-MCNC: 6.6 G/DL (ref 6.1–8.1)
PSA SERPL-MCNC: 0.78 NG/ML
SL AMB EGFR AFRICAN AMERICAN: 108 ML/MIN/1.73M2
SL AMB EGFR NON AFRICAN AMERICAN: 94 ML/MIN/1.73M2
SODIUM SERPL-SCNC: 142 MMOL/L (ref 135–146)

## 2022-03-03 DIAGNOSIS — F41.9 ANXIETY: ICD-10-CM

## 2022-03-03 RX ORDER — ALPRAZOLAM 0.25 MG/1
0.25 TABLET ORAL
Qty: 90 TABLET | Refills: 0 | OUTPATIENT
Start: 2022-03-03 | End: 2022-06-01

## 2022-03-03 NOTE — TELEPHONE ENCOUNTER
Medication: ALPRAZolam (XANAX) 0 25 mg tablet  PDMP please review, patient requesting 90 days supply since it is cheaper with insurance  Active agreement on file -No

## 2022-04-19 DIAGNOSIS — F41.9 ANXIETY: ICD-10-CM

## 2022-04-19 RX ORDER — ALPRAZOLAM 0.25 MG/1
0.25 TABLET ORAL
Qty: 90 TABLET | Refills: 0 | Status: SHIPPED | OUTPATIENT
Start: 2022-04-19 | End: 2022-07-18

## 2022-05-27 ENCOUNTER — HOSPITAL ENCOUNTER (OUTPATIENT)
Dept: GASTROENTEROLOGY | Facility: HOSPITAL | Age: 62
Setting detail: OUTPATIENT SURGERY
Discharge: HOME | End: 2022-05-27
Attending: COLON & RECTAL SURGERY
Payer: COMMERCIAL

## 2022-05-27 ENCOUNTER — ANESTHESIA EVENT (OUTPATIENT)
Dept: GASTROENTEROLOGY | Facility: HOSPITAL | Age: 62
End: 2022-05-27

## 2022-05-27 ENCOUNTER — ANESTHESIA (OUTPATIENT)
Dept: GASTROENTEROLOGY | Facility: HOSPITAL | Age: 62
End: 2022-05-27

## 2022-05-27 VITALS
HEART RATE: 76 BPM | RESPIRATION RATE: 16 BRPM | DIASTOLIC BLOOD PRESSURE: 79 MMHG | OXYGEN SATURATION: 99 % | TEMPERATURE: 97.4 F | SYSTOLIC BLOOD PRESSURE: 131 MMHG

## 2022-05-27 DIAGNOSIS — Z80.0 FAMILY HISTORY OF COLON CANCER IN MOTHER: ICD-10-CM

## 2022-05-27 PROCEDURE — 45385 COLONOSCOPY W/LESION REMOVAL: CPT | Performed by: COLON & RECTAL SURGERY

## 2022-05-27 PROCEDURE — 88305 TISSUE EXAM BY PATHOLOGIST: CPT | Performed by: PATHOLOGY

## 2022-05-27 PROCEDURE — 99213 OFFICE O/P EST LOW 20 MIN: CPT | Performed by: COLON & RECTAL SURGERY

## 2022-05-27 RX ORDER — SODIUM CHLORIDE 9 MG/ML
INJECTION, SOLUTION INTRAVENOUS CONTINUOUS PRN
Status: DISCONTINUED | OUTPATIENT
Start: 2022-05-27 | End: 2022-05-27

## 2022-05-27 RX ORDER — SODIUM CHLORIDE 9 MG/ML
75 INJECTION, SOLUTION INTRAVENOUS CONTINUOUS
Status: CANCELLED | OUTPATIENT
Start: 2022-05-27

## 2022-05-27 RX ORDER — PROPOFOL 10 MG/ML
INJECTION, EMULSION INTRAVENOUS AS NEEDED
Status: DISCONTINUED | OUTPATIENT
Start: 2022-05-27 | End: 2022-05-27

## 2022-05-27 RX ADMIN — SODIUM CHLORIDE: 0.9 INJECTION, SOLUTION INTRAVENOUS at 10:17

## 2022-05-27 RX ADMIN — PROPOFOL 50 MG: 10 INJECTION, EMULSION INTRAVENOUS at 10:34

## 2022-05-27 RX ADMIN — PROPOFOL 50 MG: 10 INJECTION, EMULSION INTRAVENOUS at 10:30

## 2022-05-27 RX ADMIN — PROPOFOL 50 MG: 10 INJECTION, EMULSION INTRAVENOUS at 10:24

## 2022-05-27 RX ADMIN — PROPOFOL 50 MG: 10 INJECTION, EMULSION INTRAVENOUS at 10:27

## 2022-05-27 RX ADMIN — PROPOFOL 100 MG: 10 INJECTION, EMULSION INTRAVENOUS at 10:20

## 2022-05-27 NOTE — ANESTHESIA POSTPROCEDURE EVALUATION
Post-Op Assessment Note    CV Status:  Stable  Pain Score: 0    Pain management: adequate     Mental Status:  Alert and awake   Hydration Status:  Euvolemic   PONV Controlled:  Controlled   Airway Patency:  Patent      Post Op Vitals Reviewed: Yes      Staff: Anesthesiologist, CRNA         No complications documented      /75 (05/27/22 1044)    Temp (!) 97 4 °F (36 3 °C) (05/27/22 1044)    Pulse 80 (05/27/22 1044)   Resp 16 (05/27/22 1044)    SpO2 98 % (05/27/22 1044)

## 2022-05-27 NOTE — ANESTHESIA PREPROCEDURE EVALUATION
Procedure:  COLONOSCOPY    Relevant Problems   CARDIO   (+) Hyperlipidemia      MUSCULOSKELETAL   (+) Chronic lower back pain      NEURO/PSYCH   (+) Anxiety   (+) Chronic lower back pain        Physical Exam    Airway    Mallampati score: III  TM Distance: >3 FB  Neck ROM: full     Dental   No notable dental hx     Cardiovascular  Cardiovascular exam normal    Pulmonary  Pulmonary exam normal     Other Findings        Anesthesia Plan  ASA Score- 2     Anesthesia Type- IV sedation with anesthesia with ASA Monitors  Additional Monitors:   Airway Plan:           Plan Factors-Exercise tolerance (METS): >4 METS  Chart reviewed  EKG reviewed  Existing labs reviewed  Patient summary reviewed  Patient is not a current smoker  Induction- intravenous  Postoperative Plan-     Informed Consent- Anesthetic plan and risks discussed with patient

## 2022-05-27 NOTE — H&P
History and Physical   Colon and Rectal Surgery   Hans Vera 64 y o  male MRN: [id number]  Unit/Bed#:  Encounter: [id number]  05/27/22   10:13 AM      No chief complaint on file  ASSESSMENT:  Hans Vera is a 64 y o  male who presents for screening colonoscopy, last 2017  Screening colonoscopy,discussed in a face-to-face, personal, informed consent process, the benefits, alternatives, risks including not limited to bleeding, missed lesion, perforation requiring emergent surgery discussed/understood  PLAN:  Colonoscopy    History of Present Illness   HPI:  Hans Vera is a 64 y o  male who presents for colonoscopy  Denies nausea/vomiting/abdominal pain, change in bowel habits, rectal bleeding, or other constitutional symptoms  Historical Information   Past Medical History:   Diagnosis Date    Anemia     LAST ASSESSED: 6/28/17    Anxiety     Arthritis     High cholesterol     Rectal bleed     Varicella      Past Surgical History:   Procedure Laterality Date    COLONOSCOPY      ESOPHAGOGASTRODUODENOSCOPY N/A 4/17/2017    Procedure: ESOPHAGOGASTRODUODENOSCOPY (EGD); Surgeon: Quinn Salcido MD;  Location: [facility]; Service:     HERNIA REPAIR      [place] COLONOSCOPY FLX DX W/COLLJ SPEC WHEN PFRMD N/A 4/17/2017    Procedure: COLONOSCOPY;  Surgeon: Quinn Salcido MD;  Location: [facility];   Service: Colorectal       Meds/Allergies     (Not in a hospital admission)        Current Outpatient Medications:     cyclobenzaprine (FLEXERIL) 10 mg tablet, Take 1 tablet (10 mg total) by mouth daily as needed for muscle spasms, Disp: 90 tablet, Rfl: 1    ALPRAZolam (XANAX) 0 25 mg tablet, Take 1 tablet (0 25 mg total) by mouth daily at bedtime as needed for anxiety or sleep, Disp: 90 tablet, Rfl: 0    ascorbic acid (VITAMIN C) 500 mg tablet, Take 500 mg by mouth daily, Disp: , Rfl:     hydrocortisone 2 5 % lotion, Apply topically 2 (two) times a day, Disp: 118 mL, Rfl: 0   ketoconazole (NIZORAL) 2 % cream, Apply topically daily, Disp: , Rfl:     Multiple Vitamins-Minerals (MULTIVITAMIN WITH MINERALS) tablet, Take 1 tablet by mouth daily, Disp: , Rfl:     vitamin E 100 UNIT capsule, Take 100 Units by mouth every other day, Disp: , Rfl:     No Known Allergies      Social History   Social History     Substance and Sexual Activity   Alcohol Use Yes    Alcohol/week: 3 0 standard drinks    Types: 3 Cans of beer per week     Social History     Substance and Sexual Activity   Drug Use No     Social History     Tobacco Use   Smoking Status Former Smoker   Smokeless Tobacco Never Used   Tobacco Comment    smokes cigars         Family History:   Family History   Problem Relation Age of Onset    Colon cancer Mother     Diabetes Mother     Heart disease Father          Objective     Current Vitals:   Blood Pressure: 161/81 (05/27/22 0955)  Pulse: 88 (05/27/22 0955)  Temperature: 97 6 °F (36 4 °C) (05/27/22 0955)  Temp Source: Tympanic (05/27/22 0955)  Respirations: 16 (05/27/22 0955)  SpO2: 100 % (05/27/22 0955)  No intake or output data in the 24 hours ending 05/27/22 1013    Physical Exam:  General:no distress  Eyes:perrla/eomi  ENT:moist mucus membranes  Neck:supple  Pulm:no increased work of breathing  CV:sinus  Abdomen:soft,nontender

## 2022-07-26 ENCOUNTER — TELEPHONE (OUTPATIENT)
Dept: FAMILY MEDICINE CLINIC | Facility: CLINIC | Age: 62
End: 2022-07-26

## 2022-07-26 DIAGNOSIS — R21 RASH: Primary | ICD-10-CM

## 2022-07-26 NOTE — TELEPHONE ENCOUNTER
Patient has appt for 6 mo f/u 8/16/22  Having his routine labs next week  Is requesting lab allergy test because he has rash on legs and back for about 4 months  Does have an appt with allergist in October

## 2022-08-06 LAB
A ALTERNATA IGE QN: <0.1 KU/L
ALBUMIN SERPL-MCNC: 4.5 G/DL (ref 3.6–5.1)
ALBUMIN/GLOB SERPL: 2 (CALC) (ref 1–2.5)
ALP SERPL-CCNC: 55 U/L (ref 35–144)
ALT SERPL-CCNC: 18 U/L (ref 9–46)
AST SERPL-CCNC: 23 U/L (ref 10–35)
BILIRUB SERPL-MCNC: 0.7 MG/DL (ref 0.2–1.2)
BUN SERPL-MCNC: 17 MG/DL (ref 7–25)
BUN/CREAT SERPL: NORMAL (CALC) (ref 6–22)
C HERBARUM IGE QN: <0.1 KU/L
CALCIUM SERPL-MCNC: 9.7 MG/DL (ref 8.6–10.3)
CAT DANDER IGE QN: 0.84 KU/L
CHLORIDE SERPL-SCNC: 105 MMOL/L (ref 98–110)
CHOLEST SERPL-MCNC: 219 MG/DL
CHOLEST/HDLC SERPL: 2.9 (CALC)
CO2 SERPL-SCNC: 27 MMOL/L (ref 20–32)
COMMON RAGWEED IGE QN: <0.1 KU/L
CREAT SERPL-MCNC: 0.84 MG/DL (ref 0.7–1.35)
D FARINAE IGE QN: 15.1 KU/L
DEPRECATED A ALTERNATA IGE RAST QL: 0
DEPRECATED C HERBARUM IGE RAST QL: 0
DEPRECATED CAT DANDER IGE RAST QL: 2
DEPRECATED COMMON RAGWEED IGE RAST QL: 0
DEPRECATED D FARINAE IGE RAST QL: 3
DEPRECATED DOG DANDER IGE RAST QL: ABNORMAL
DEPRECATED GOOSEFOOT IGE RAST QL: 0
DEPRECATED KENT BLUE GRASS IGE RAST QL: 2
DEPRECATED TIMOTHY IGE RAST QL: 2
DEPRECATED WHITE OAK IGE RAST QL: 0
DOG DANDER IGE QN: 0.28 KU/L
GFR/BSA.PRED SERPLBLD CYS-BASED-ARV: 99 ML/MIN/1.73M2
GLOBULIN SER CALC-MCNC: 2.3 G/DL (CALC) (ref 1.9–3.7)
GLUCOSE SERPL-MCNC: 95 MG/DL (ref 65–99)
GOOSEFOOT IGE QN: <0.1 KU/L
HBA1C MFR BLD: 5.5 % OF TOTAL HGB
HDLC SERPL-MCNC: 76 MG/DL
KENT BLUE GRASS IGE QN: 0.87 KU/L
LDLC SERPL CALC-MCNC: 128 MG/DL (CALC)
NONHDLC SERPL-MCNC: 143 MG/DL (CALC)
POTASSIUM SERPL-SCNC: 4.3 MMOL/L (ref 3.5–5.3)
PROT SERPL-MCNC: 6.8 G/DL (ref 6.1–8.1)
SODIUM SERPL-SCNC: 139 MMOL/L (ref 135–146)
TIMOTHY IGE QN: 0.87 KU/L
TRIGL SERPL-MCNC: 61 MG/DL
WHITE OAK IGE QN: <0.1 KU/L

## 2022-08-16 ENCOUNTER — OFFICE VISIT (OUTPATIENT)
Dept: FAMILY MEDICINE CLINIC | Facility: CLINIC | Age: 62
End: 2022-08-16
Payer: COMMERCIAL

## 2022-08-16 VITALS
HEART RATE: 80 BPM | WEIGHT: 146 LBS | RESPIRATION RATE: 16 BRPM | BODY MASS INDEX: 21.62 KG/M2 | TEMPERATURE: 99 F | HEIGHT: 69 IN | SYSTOLIC BLOOD PRESSURE: 120 MMHG | DIASTOLIC BLOOD PRESSURE: 78 MMHG

## 2022-08-16 DIAGNOSIS — F41.9 ANXIETY: ICD-10-CM

## 2022-08-16 DIAGNOSIS — G89.29 CHRONIC MIDLINE LOW BACK PAIN WITHOUT SCIATICA: Primary | ICD-10-CM

## 2022-08-16 DIAGNOSIS — M54.50 CHRONIC MIDLINE LOW BACK PAIN WITHOUT SCIATICA: Primary | ICD-10-CM

## 2022-08-16 DIAGNOSIS — E78.2 MIXED HYPERLIPIDEMIA: ICD-10-CM

## 2022-08-16 DIAGNOSIS — Z12.5 SCREENING FOR PROSTATE CANCER: ICD-10-CM

## 2022-08-16 DIAGNOSIS — R73.01 IFG (IMPAIRED FASTING GLUCOSE): ICD-10-CM

## 2022-08-16 DIAGNOSIS — Z79.899 CONTROLLED SUBSTANCE AGREEMENT SIGNED: ICD-10-CM

## 2022-08-16 PROCEDURE — 3725F SCREEN DEPRESSION PERFORMED: CPT | Performed by: FAMILY MEDICINE

## 2022-08-16 PROCEDURE — 99214 OFFICE O/P EST MOD 30 MIN: CPT | Performed by: FAMILY MEDICINE

## 2022-08-16 RX ORDER — ALPRAZOLAM 0.25 MG/1
0.25 TABLET ORAL
Qty: 90 TABLET | Refills: 0 | Status: SHIPPED | OUTPATIENT
Start: 2022-08-16 | End: 2022-11-14

## 2022-08-16 RX ORDER — CYCLOBENZAPRINE HCL 10 MG
10 TABLET ORAL DAILY PRN
Qty: 90 TABLET | Refills: 1 | Status: SHIPPED | OUTPATIENT
Start: 2022-08-16 | End: 2022-11-14

## 2022-08-16 NOTE — ASSESSMENT & PLAN NOTE
Continue flexeril prn  SE educated pt  Check xray and refer to pain specialist  Pt refused physical therapy now

## 2022-08-16 NOTE — PROGRESS NOTES
Chief Complaint   Patient presents with    Follow-up     6 month follow up      Health Maintenance   Topic Date Due    Depression Follow-up Plan  Never done    COVID-19 Vaccine (4 - Booster for Ernesto Yadav series) 03/15/2022    Influenza Vaccine (1) 09/01/2022    Annual Physical  02/16/2023    Depression Screening  08/16/2023    BMI: Adult  08/16/2023    DTaP,Tdap,and Td Vaccines (2 - Td or Tdap) 05/24/2026    Colorectal Cancer Screening  05/26/2027    HIV Screening  Completed    Hepatitis C Screening  Completed    Pneumococcal Vaccine: Pediatrics (0 to 5 Years) and At-Risk Patients (6 to 59 Years)  Aged Out    HIB Vaccine  Aged Out    Hepatitis B Vaccine  Aged Out    IPV Vaccine  Aged Out    Hepatitis A Vaccine  Aged Out    Meningococcal ACWY Vaccine  Aged Out    HPV Vaccine  Aged Out       Assessment/Plan:    Chronic lower back pain  Continue flexeril prn  SE educated pt  Check xray and refer to pain specialist  Pt refused physical therapy now  Anxiety  Use xanax 0 25mg prn  SE educated pt  IFG (impaired fasting glucose)  8/2022 hgA1C 5 5 normal  Low carb diet  Hyperlipidemia  8/2022 lipid 219/76/61/128 elevated  ASCVD 6 8%  Advised pt to follow low fat diet  Reviwed lab in 8/2022  HgA1C 5 5 normal  Lipid 219/76/61/128  CMP ok    Got Flu shot yearly  Got covid19 shots and booster  Tdap 2016  Got shingrix  PSA yearly  Pros and cons educated pt  2/2022 PSA 0 78  Colonoscopy 5/2022,  Repeat in 5 years  RTO in 6 months  Diagnoses and all orders for this visit:    Chronic midline low back pain without sciatica  -     XR spine lumbar minimum 4 views non injury; Future  -     Ambulatory Referral to Pain Management; Future  -     cyclobenzaprine (FLEXERIL) 10 mg tablet; Take 1 tablet (10 mg total) by mouth daily as needed for muscle spasms    Anxiety  -     CBC; Future  -     Comprehensive metabolic panel; Future  -     Hemoglobin A1C; Future  -     Lipid panel;  Future  - TSH, 3rd generation with Free T4 reflex; Future  -     PSA, Total Screen; Future  -     ALPRAZolam (XANAX) 0 25 mg tablet; Take 1 tablet (0 25 mg total) by mouth daily at bedtime as needed for anxiety or sleep    Controlled substance agreement signed    IFG (impaired fasting glucose)  -     CBC; Future  -     Comprehensive metabolic panel; Future  -     Hemoglobin A1C; Future  -     Lipid panel; Future  -     TSH, 3rd generation with Free T4 reflex; Future  -     PSA, Total Screen; Future    Mixed hyperlipidemia  -     CBC; Future  -     Comprehensive metabolic panel; Future  -     Hemoglobin A1C; Future  -     Lipid panel; Future  -     TSH, 3rd generation with Free T4 reflex; Future  -     PSA, Total Screen; Future    Screening for prostate cancer  -     CBC; Future  -     Comprehensive metabolic panel; Future  -     Hemoglobin A1C; Future  -     Lipid panel; Future  -     TSH, 3rd generation with Free T4 reflex; Future  -     PSA, Total Screen; Future          Subjective:      Patient ID: Moi Ross is a 64 y o  male  HPI    Pt is here by himself  Lower Back pain---years  Lower middle back pain  Constant, 6/10  Tightness pain  No radiating to legs  Stephanie Romero and hurt lower back 30 years ago  Saw chiopractor before but no help  Pt refused physical therapy  Would like to see pain specialist    He uses flexeril daily which helped  Anxiety---He uses xanax 0 25mg at night as needed which helped  Cigar on weekends  Beers on weekends  3 beer/night  No drugs  Denies depression  The following portions of the patient's history were reviewed and updated as appropriate: allergies, current medications, past family history, past medical history, past social history, past surgical history and problem list     Review of Systems   Constitutional: Negative for appetite change, chills and fever  HENT: Negative for congestion, ear pain, sinus pain and sore throat      Eyes: Negative for discharge and itching  Respiratory: Negative for apnea, cough, chest tightness, shortness of breath and wheezing  Cardiovascular: Negative for chest pain, palpitations and leg swelling  Gastrointestinal: Negative for abdominal pain, anal bleeding, constipation, diarrhea, nausea and vomiting  Endocrine: Negative for cold intolerance, heat intolerance and polyuria  Genitourinary: Negative for difficulty urinating and dysuria  Musculoskeletal: Positive for back pain  Negative for arthralgias and myalgias  Skin: Negative for rash  Neurological: Negative for dizziness and headaches  Psychiatric/Behavioral: Negative for agitation  Objective:      /78   Pulse 80   Temp 99 °F (37 2 °C) (Tympanic)   Resp 16   Ht 5' 8 5" (1 74 m)   Wt 66 2 kg (146 lb)   BMI 21 88 kg/m²          Physical Exam  Constitutional:       Appearance: He is well-developed  HENT:      Head: Normocephalic and atraumatic  Eyes:      General:         Right eye: No discharge  Left eye: No discharge  Conjunctiva/sclera: Conjunctivae normal    Cardiovascular:      Rate and Rhythm: Normal rate and regular rhythm  Heart sounds: Normal heart sounds  No murmur heard  No friction rub  No gallop  Pulmonary:      Effort: Pulmonary effort is normal  No respiratory distress  Breath sounds: Normal breath sounds  No wheezing or rales  Abdominal:      General: Bowel sounds are normal  There is no distension  Palpations: Abdomen is soft  Tenderness: There is no abdominal tenderness  There is no guarding  Musculoskeletal:         General: Tenderness present  Normal range of motion  Cervical back: Normal range of motion and neck supple  No tenderness  Right lower leg: No edema  Left lower leg: No edema  Comments: Middle lower back tenderness, no swollen or erythema   Lymphadenopathy:      Cervical: No cervical adenopathy  Neurological:      Mental Status: He is alert

## 2022-09-07 ENCOUNTER — HOSPITAL ENCOUNTER (OUTPATIENT)
Dept: RADIOLOGY | Facility: HOSPITAL | Age: 62
Discharge: HOME | End: 2022-09-07
Payer: COMMERCIAL

## 2022-09-07 DIAGNOSIS — M54.50 CHRONIC MIDLINE LOW BACK PAIN WITHOUT SCIATICA: ICD-10-CM

## 2022-09-07 DIAGNOSIS — G89.29 CHRONIC MIDLINE LOW BACK PAIN WITHOUT SCIATICA: ICD-10-CM

## 2022-09-07 PROCEDURE — 72110 X-RAY EXAM L-2 SPINE 4/>VWS: CPT

## 2022-10-07 ENCOUNTER — CONSULT (OUTPATIENT)
Dept: PAIN MEDICINE | Facility: CLINIC | Age: 62
End: 2022-10-07
Payer: COMMERCIAL

## 2022-10-07 VITALS
BODY MASS INDEX: 22.73 KG/M2 | DIASTOLIC BLOOD PRESSURE: 83 MMHG | WEIGHT: 150 LBS | HEIGHT: 68 IN | HEART RATE: 103 BPM | SYSTOLIC BLOOD PRESSURE: 128 MMHG

## 2022-10-07 DIAGNOSIS — M47.816 LUMBAR SPONDYLOSIS: ICD-10-CM

## 2022-10-07 DIAGNOSIS — M54.50 CHRONIC MIDLINE LOW BACK PAIN WITHOUT SCIATICA: Primary | ICD-10-CM

## 2022-10-07 DIAGNOSIS — G89.29 CHRONIC MIDLINE LOW BACK PAIN WITHOUT SCIATICA: Primary | ICD-10-CM

## 2022-10-07 PROCEDURE — 99244 OFF/OP CNSLTJ NEW/EST MOD 40: CPT | Performed by: ANESTHESIOLOGY

## 2022-10-07 NOTE — PROGRESS NOTES
Assessment  1  Chronic midline low back pain without sciatica    2  Lumbar spondylosis        Plan  51-year-old male referred by Dr Claudean Seta, presenting for initial consultation regarding a long-standing history of axial and mechanical lumbosacral back pain without any radicular symptoms into his lower extremities  X-ray of the lumbar spine shows dextroscoliosis and facet arthrosis with mild disc space narrowing particularly of the mid to lower lumbar spine  The patient was noted to have transitional anatomy  Patient has not physical therapy or chiropractic treatment recently for his low back pain  He did find some transient relief with chiropractic treatment  The patient does take Flexeril p r n  with some relief  He also takes Omega 3 fatty acids with some relief  The patient prefers to avoid NSAIDs if possible  Patient's low back pain seems to be mostly myofascial and facet mediated nature  No evidence of sacroiliitis or lumbar radiculitis/radiculopathy on exam       1  I will refer the patient for chiropractic treatment  2  If the patient fails conservative treatment may consider an an MRI of the lumbar spine without contrast to evaluate for discogenic pathology  3  May consider lumbar medial branch blocks in the future to address the facet mediated component of his low back pain  If the patient has a favorable result x2 we will proceed with RFA for longer lasting relief  4  The patient was asking about holistic treatments for back pain and I did recommend turmeric 500 mg b i d  and tart cherry juice in addition to his Omega 3 fatty acids   5  I will follow up with the patient in 6 weeks or sooner if needed      My impressions and treatment recommendations were discussed in detail with the patient who verbalized understanding and had no further questions  Discharge instructions were provided  I personally saw and examined the patient and I agree with the above discussed plan of care      No orders of the defined types were placed in this encounter  No orders of the defined types were placed in this encounter  History of Present Illness    Royce Alonzo is a 64 y o  male referred by Dr Kvng Paris, presenting for initial consultation regarding a long-standing history of axial and mechanical lumbosacral back pain without any radicular symptoms into his lower extremities  He denies any bladder or bowel incontinence or saddle anesthesia  X-ray of the lumbar spine shows dextroscoliosis and facet arthrosis with mild disc space narrowing particularly of the mid to lower lumbar spine  The patient was noted to have transitional anatomy  Patient has not physical therapy or chiropractic treatment recently for his low back pain  He did find some transient relief with chiropractic treatment  The patient does take Flexeril p r n  with some relief  He also takes Omega 3 fatty acids with some relief  The patient prefers to avoid NSAIDs if possible  The patient rates his pain as 7/10 on the pain is nearly constant  The pain is worse at night  The pain is described as dull, aching, throbbing, pressure-like, and muscle tightness  The pain is increased with lying down and alleviated with sitting and relaxation  He also find some relief with heat and ice application and a TENS unit  Other than as stated above, the patient denies any interval changes in medications, medical condition, mental condition, symptoms, or allergies since the last office visit  I have personally reviewed and/or updated the patient's past medical history, past surgical history, family history, social history, current medications, allergies, and vital signs today  Review of Systems   Constitutional: Positive for unexpected weight change  Negative for fever  HENT: Negative for trouble swallowing  Eyes: Negative for visual disturbance  Respiratory: Negative for shortness of breath and wheezing      Cardiovascular: Negative for chest pain and palpitations  Gastrointestinal: Negative for constipation, diarrhea, nausea and vomiting  Endocrine: Negative for cold intolerance, heat intolerance and polydipsia  Genitourinary: Negative for difficulty urinating and frequency  Musculoskeletal: Positive for myalgias  Negative for arthralgias, gait problem and joint swelling  Skin: Positive for rash  Neurological: Negative for dizziness, seizures, syncope, weakness and headaches  Hematological: Does not bruise/bleed easily  Psychiatric/Behavioral: Negative for dysphoric mood  Anxiety   All other systems reviewed and are negative  Patient Active Problem List   Diagnosis    Acne vulgaris    Anxiety    Chronic lower back pain    Hyperlipidemia    IFG (impaired fasting glucose)    Insomnia    Screening for prostate cancer       Past Medical History:   Diagnosis Date    Anemia     LAST ASSESSED: 6/28/17    Anxiety     Arthritis     High cholesterol     Rectal bleed     Varicella        Past Surgical History:   Procedure Laterality Date    COLONOSCOPY      ESOPHAGOGASTRODUODENOSCOPY N/A 4/17/2017    Procedure: ESOPHAGOGASTRODUODENOSCOPY (EGD); Surgeon: Carissa Pugh MD;  Location: [facility]; Service:     HERNIA REPAIR      [place] COLONOSCOPY FLX DX W/COLLJ SPEC WHEN PFRMD N/A 4/17/2017    Procedure: COLONOSCOPY;  Surgeon: Carissa Pugh MD;  Location: [facility]; Service: Colorectal       Family History   Problem Relation Age of Onset    Colon cancer Mother     Diabetes Mother     Heart disease Father        Social History     Occupational History    Not on file   Tobacco Use    Smoking status: Former Smoker    Smokeless tobacco: Never Used    Tobacco comment: smokes cigars   Substance and Sexual Activity    Alcohol use:  Yes     Alcohol/week: 3 0 standard drinks     Types: 3 Cans of beer per week    Drug use: No    Sexual activity: Not on file       Current Outpatient Medications on File Prior to Visit   Medication Sig    ALPRAZolam (XANAX) 0 25 mg tablet Take 1 tablet (0 25 mg total) by mouth daily at bedtime as needed for anxiety or sleep    cyclobenzaprine (FLEXERIL) 10 mg tablet Take 1 tablet (10 mg total) by mouth daily as needed for muscle spasms    hydrocortisone 2 5 % lotion Apply topically 2 (two) times a day    ketoconazole (NIZORAL) 2 % cream Apply topically daily    Multiple Vitamins-Minerals (MULTIVITAMIN WITH MINERALS) tablet Take 1 tablet by mouth daily     No current facility-administered medications on file prior to visit  No Known Allergies    Physical Exam    Ht 5' 8" (1 727 m)   Wt 68 kg (150 lb)   BMI 22 81 kg/m²     Constitutional: normal, well developed, well nourished, alert, in no distress and non-toxic and no overt pain behavior  Eyes: anicteric  HEENT: grossly intact  Neck: supple, symmetric, trachea midline and no masses   Pulmonary:even and unlabored  Cardiovascular:No edema or pitting edema present  Skin:Normal without rashes or lesions and well hydrated  Psychiatric:Mood and affect appropriate  Neurologic:Cranial Nerves II-XII grossly intact  Musculoskeletal:normal gait  Bilateral lumbar paraspinals mildly tender to palpation  Bilateral SI joints nontender to palpation  Bilateral trochanteric flares nontender to palpation  Bilateral patellar and Achilles reflexes were 2/4 and symmetrical   No clonus was noted bilaterally  Bilateral lower extremity strength was 5/5 in all muscle groups  Sensation intact to light touch in L3 thru S1 dermatomes bilaterally  Negative straight leg raise bilaterally  Negative Marty's and Gaenslen's test bilaterally  Imaging  LUMBAR SPINE     INDICATION:   M54 50: Low back pain, unspecified  G89 29:  Other chronic pain      COMPARISON:  None     VIEWS:  XR SPINE LUMBAR MINIMUM 4 VIEWS NON INJURY        FINDINGS:     Asymmetric transitional vertebra present at the lumbosacral junction       There is rotatory dextroscoliosis      No compression fracture      Multilevel discogenic degenerative change of the lumbar spine, with moderate narrowing of disc spaces and endplate spurring as well as facet joint osteoarthritis      The pedicles appear intact      Soft tissues are unremarkable      IMPRESSION:     Asymmetric transitional vertebra at the lumbosacral junction, discogenic degenerative change, facet joint arthritis, and rotatory dextroscoliosis

## 2022-10-25 PROBLEM — J30.89 ALLERGIC RHINITIS DUE TO HOUSE DUST MITE: Status: ACTIVE | Noted: 2022-10-25

## 2022-11-16 ENCOUNTER — TELEPHONE (OUTPATIENT)
Dept: PAIN MEDICINE | Facility: CLINIC | Age: 62
End: 2022-11-16

## 2022-11-16 NOTE — TELEPHONE ENCOUNTER
Caller: Vicente  Doctor: Kalyani Clark    Reason for call:  Pt saw Andrew Cazares one time in October  A mention of a block was discussed  Pt would like to make a appointment to have a procedure done of a steroid injection  Can you please put in a order for that? Pt does have a appointment with [address] [place] [address] on 11/29/22   If you put a order in please cx [address] [place] [address] appointment and advise pt on if you will do the injection instead    Call back#: [phone]

## 2022-11-18 NOTE — TELEPHONE ENCOUNTER
S/w pt who was had consult on 10/7/22 w/ JW  Pt states he has completed 3 sessions of chiro w/ minimal relief  Pt states pain is down to 6-7/10 from prev 7-8/10  Pt states pain is sharp and locate in LB w/ no radiating qualities  Pt states he would advisement on proceeding w/ TYLER  RN reviewed consult OV note w/ pt and explained that conservative tx was to be done w/ the intent of further imaging in the form of an MRI and that MBB's had been dscussed for the future  Pt states he would not like to do an MRI in regards to the cost w/ his insur or MBB's  Pt using holistic therapies for inflammation relief as advised at consult  Nxt OV 11/29/22 w/ STEPHEN  Pls advise

## 2022-11-18 NOTE — TELEPHONE ENCOUNTER
Caller: Pt    Doctor/Office: Dr Grace Golden asked to speak to: Procedure    Call was transferred to: Nurse

## 2022-11-21 NOTE — TELEPHONE ENCOUNTER
Patient needs to complete 6 weeks of chiropractic treatment and then if no improvement an MRI of the lumbar spine would be ordered as per my consultation note  If the patient does not want to follow my recommendations, we will discuss this further at his office visit    This was reviewed with the patient in length during his consultation

## 2022-11-21 NOTE — TELEPHONE ENCOUNTER
S/w pt, he will complete the 6 weeks of chiropractic tx and will call us once complete  Pt cx'd 11/29 appt

## 2022-11-21 NOTE — TELEPHONE ENCOUNTER
Caller: Patient  Doctor: Mesfin Kimbrough    Reason for call: pt is calling back a nurse    Call back#: [phone]

## 2023-02-11 LAB
ALBUMIN SERPL-MCNC: 4.3 G/DL (ref 3.6–5.1)
ALBUMIN/GLOB SERPL: 1.7 (CALC) (ref 1–2.5)
ALP SERPL-CCNC: 51 U/L (ref 35–144)
ALT SERPL-CCNC: 18 U/L (ref 9–46)
AST SERPL-CCNC: 24 U/L (ref 10–35)
BASOPHILS # BLD AUTO: 60 CELLS/UL (ref 0–200)
BASOPHILS NFR BLD AUTO: 1 %
BILIRUB SERPL-MCNC: 0.7 MG/DL (ref 0.2–1.2)
BUN SERPL-MCNC: 17 MG/DL (ref 7–25)
BUN/CREAT SERPL: NORMAL (CALC) (ref 6–22)
CALCIUM SERPL-MCNC: 9.6 MG/DL (ref 8.6–10.3)
CHLORIDE SERPL-SCNC: 103 MMOL/L (ref 98–110)
CHOLEST SERPL-MCNC: 208 MG/DL
CHOLEST/HDLC SERPL: 2.9 (CALC)
CO2 SERPL-SCNC: 30 MMOL/L (ref 20–32)
CREAT SERPL-MCNC: 0.8 MG/DL (ref 0.7–1.35)
EOSINOPHIL # BLD AUTO: 582 CELLS/UL (ref 15–500)
EOSINOPHIL NFR BLD AUTO: 9.7 %
ERYTHROCYTE [DISTWIDTH] IN BLOOD BY AUTOMATED COUNT: 11.9 % (ref 11–15)
GFR/BSA.PRED SERPLBLD CYS-BASED-ARV: 100 ML/MIN/1.73M2
GLOBULIN SER CALC-MCNC: 2.5 G/DL (CALC) (ref 1.9–3.7)
GLUCOSE SERPL-MCNC: 94 MG/DL (ref 65–99)
HBA1C MFR BLD: 5.6 % OF TOTAL HGB
HCT VFR BLD AUTO: 44.4 % (ref 38.5–50)
HDLC SERPL-MCNC: 71 MG/DL
HGB BLD-MCNC: 14.5 G/DL (ref 13.2–17.1)
LDLC SERPL CALC-MCNC: 118 MG/DL (CALC)
LYMPHOCYTES # BLD AUTO: 1662 CELLS/UL (ref 850–3900)
LYMPHOCYTES NFR BLD AUTO: 27.7 %
MCH RBC QN AUTO: 29.8 PG (ref 27–33)
MCHC RBC AUTO-ENTMCNC: 32.7 G/DL (ref 32–36)
MCV RBC AUTO: 91.2 FL (ref 80–100)
MONOCYTES # BLD AUTO: 546 CELLS/UL (ref 200–950)
MONOCYTES NFR BLD AUTO: 9.1 %
NEUTROPHILS # BLD AUTO: 3150 CELLS/UL (ref 1500–7800)
NEUTROPHILS NFR BLD AUTO: 52.5 %
NONHDLC SERPL-MCNC: 137 MG/DL (CALC)
PLATELET # BLD AUTO: 249 THOUSAND/UL (ref 140–400)
PMV BLD REES-ECKER: 9.9 FL (ref 7.5–12.5)
POTASSIUM SERPL-SCNC: 4.1 MMOL/L (ref 3.5–5.3)
PROT SERPL-MCNC: 6.8 G/DL (ref 6.1–8.1)
PSA SERPL-MCNC: 1.17 NG/ML
RBC # BLD AUTO: 4.87 MILLION/UL (ref 4.2–5.8)
SODIUM SERPL-SCNC: 140 MMOL/L (ref 135–146)
TRIGL SERPL-MCNC: 89 MG/DL
TSH SERPL-ACNC: 2.28 MIU/L (ref 0.4–4.5)
WBC # BLD AUTO: 6 THOUSAND/UL (ref 3.8–10.8)

## 2023-02-17 ENCOUNTER — TELEPHONE (OUTPATIENT)
Dept: PAIN MEDICINE | Facility: CLINIC | Age: 63
End: 2023-02-17

## 2023-02-17 DIAGNOSIS — M54.50 CHRONIC BILATERAL LOW BACK PAIN WITHOUT SCIATICA: Primary | ICD-10-CM

## 2023-02-17 DIAGNOSIS — G89.29 CHRONIC BILATERAL LOW BACK PAIN WITHOUT SCIATICA: Primary | ICD-10-CM

## 2023-02-17 NOTE — TELEPHONE ENCOUNTER
S/W pt, he finished chiropractic therapy and reports not having any relief in his lumbar region, still has constant nagging pain  Per consult MRI to be ordered once chiropractic sessions complete  Advised pt nurse to CB with next steps in tx plan  Pt appreciative of call       Please advise on MRI

## 2023-02-17 NOTE — TELEPHONE ENCOUNTER
Caller: Tari Mendoza ( PT )    Doctor: Dr Brian Rizzo    Reason for call: Pt has completed the Chiropractic treatment and would like to know what the next step will be     Call back#: [phone]

## 2023-02-17 NOTE — TELEPHONE ENCOUNTER
Caller:  Zeeshan Gunderson (PT)    Doctor/Office: Dr Sabino Spencer     Call regarding :  MRI      Call was transferred to: N/A

## 2023-02-21 ENCOUNTER — OFFICE VISIT (OUTPATIENT)
Dept: FAMILY MEDICINE CLINIC | Facility: CLINIC | Age: 63
End: 2023-02-21

## 2023-02-21 VITALS
RESPIRATION RATE: 16 BRPM | BODY MASS INDEX: 22.13 KG/M2 | DIASTOLIC BLOOD PRESSURE: 64 MMHG | WEIGHT: 149.4 LBS | HEIGHT: 69 IN | HEART RATE: 86 BPM | SYSTOLIC BLOOD PRESSURE: 118 MMHG | TEMPERATURE: 98.1 F

## 2023-02-21 DIAGNOSIS — M54.50 CHRONIC BILATERAL LOW BACK PAIN WITHOUT SCIATICA: ICD-10-CM

## 2023-02-21 DIAGNOSIS — E78.2 MIXED HYPERLIPIDEMIA: ICD-10-CM

## 2023-02-21 DIAGNOSIS — M54.50 CHRONIC MIDLINE LOW BACK PAIN WITHOUT SCIATICA: ICD-10-CM

## 2023-02-21 DIAGNOSIS — Z00.00 ANNUAL PHYSICAL EXAM: ICD-10-CM

## 2023-02-21 DIAGNOSIS — F41.9 ANXIETY: ICD-10-CM

## 2023-02-21 DIAGNOSIS — R73.01 IFG (IMPAIRED FASTING GLUCOSE): ICD-10-CM

## 2023-02-21 DIAGNOSIS — G89.29 CHRONIC BILATERAL LOW BACK PAIN WITHOUT SCIATICA: ICD-10-CM

## 2023-02-21 DIAGNOSIS — E78.2 MIXED HYPERLIPIDEMIA: Primary | ICD-10-CM

## 2023-02-21 DIAGNOSIS — G89.29 CHRONIC MIDLINE LOW BACK PAIN WITHOUT SCIATICA: ICD-10-CM

## 2023-02-21 RX ORDER — ALPRAZOLAM 0.25 MG/1
0.25 TABLET ORAL
Qty: 90 TABLET | Refills: 0 | Status: SHIPPED | OUTPATIENT
Start: 2023-02-21 | End: 2023-05-22

## 2023-02-21 RX ORDER — CYCLOBENZAPRINE HCL 10 MG
10 TABLET ORAL DAILY PRN
Qty: 90 TABLET | Refills: 1 | Status: SHIPPED | OUTPATIENT
Start: 2023-02-21 | End: 2023-05-22

## 2023-02-21 RX ORDER — SIMVASTATIN 20 MG
20 TABLET ORAL
Qty: 90 TABLET | Refills: 1 | Status: SHIPPED | OUTPATIENT
Start: 2023-02-21 | End: 2023-08-22 | Stop reason: SDUPTHER

## 2023-02-21 RX ORDER — SIMVASTATIN 20 MG
20 TABLET ORAL
Qty: 30 TABLET | Refills: 5 | Status: SHIPPED | OUTPATIENT
Start: 2023-02-21 | End: 2023-02-21 | Stop reason: SDUPTHER

## 2023-02-21 NOTE — PROGRESS NOTES
Name: Feng Myers      : 1960      MRN: [id number]  Encounter Provider: Jose Kim MD  Encounter Date: 2023   Encounter department: [address] [place] [address]     1  Mixed hyperlipidemia  Assessment & Plan:  2023 lipid elevated  ASCVD 10 9% Give simvastatin 20mg qhs  SE educated pt  Orders:  -     simvastatin (ZOCOR) 20 mg tablet; Take 1 tablet (20 mg total) by mouth daily at bedtime  -     Comprehensive metabolic panel; Future; Expected date: 08/10/2023  -     Lipid panel; Future; Expected date: 08/10/2023    2  Anxiety  Assessment & Plan:  Continue xanax 0 25mg qhs as needed  SE educated pt  PDMP checked and it was appropriate  Orders:  -     ALPRAZolam (XANAX) 0 25 mg tablet; Take 1 tablet (0 25 mg total) by mouth daily at bedtime as needed for anxiety or sleep    3  Chronic bilateral low back pain without sciatica  Assessment & Plan:  Continue flexeril 10mg daily prn        4  Chronic midline low back pain without sciatica  Assessment & Plan:  Continue flexeril 10mg daily prn  Orders:  -     cyclobenzaprine (FLEXERIL) 10 mg tablet; Take 1 tablet (10 mg total) by mouth daily as needed for muscle spasms    5  IFG (impaired fasting glucose)  Assessment & Plan:  2023 hgA1C 5 6 normal  Low carb diet  6  Annual physical exam        Reviewed lab in 2023  hgA1C 5 6 normal  Lipid 208/89/71/118 elevated  CMP good  CBC ok  PSA 1 17 good  TSH normal    Got Flu shot yearly  Got covid19 shots and boosters    Tdap 2016  Got shingrix  PSA yearly  Pros and cons educated pt  Colonoscopy 2022,  Repeat in 5 years  RTO in 6 months  Subjective      HPI     Pt is here by himself       Hyperlipidemia---Not on statins  Tried to eat healthy  Anxiety---He uses xanax 0 25mg at night as needed which helped  Lower Back pain---years  Lower middle back pain  Constant, 6/10  Tightness pain  No radiating to legs     He uses flexeril daily which helped  Saw pain specialist  Recommend chiropractor and tumeric  Plan to do MRI       Cigar on weekends  Beer 4/week  No drugs       Denies depression  Review of Systems   Constitutional: Negative for appetite change, chills and fever  HENT: Negative for congestion, ear pain, sinus pain and sore throat  Eyes: Negative for discharge and itching  Respiratory: Negative for apnea, cough, chest tightness, shortness of breath and wheezing  Cardiovascular: Negative for chest pain, palpitations and leg swelling  Gastrointestinal: Negative for abdominal pain, anal bleeding, constipation, diarrhea, nausea and vomiting  Endocrine: Negative for cold intolerance, heat intolerance and polyuria  Genitourinary: Negative for difficulty urinating and dysuria  Musculoskeletal: Positive for back pain  Negative for arthralgias and myalgias  Skin: Negative for rash  Neurological: Negative for dizziness and headaches  Psychiatric/Behavioral: Negative for agitation, self-injury, sleep disturbance and suicidal ideas  The patient is nervous/anxious          Current Outpatient Medications on File Prior to Visit   Medication Sig   • hydrocortisone 2 5 % lotion Apply topically 2 (two) times a day   • ketoconazole (NIZORAL) 2 % cream Apply topically daily   • Multiple Vitamins-Minerals (MULTIVITAMIN WITH MINERALS) tablet Take 1 tablet by mouth daily   • ST JOHNS WORT PO Take by mouth   • Turmeric 500 MG CAPS Take by mouth   • [DISCONTINUED] ALPRAZolam (XANAX) 0 25 mg tablet Take 1 tablet (0 25 mg total) by mouth daily at bedtime as needed for anxiety or sleep   • [DISCONTINUED] cyclobenzaprine (FLEXERIL) 10 mg tablet Take 1 tablet (10 mg total) by mouth daily as needed for muscle spasms   • [DISCONTINUED] NON FORMULARY [place] Sleeping Essential   • [DISCONTINUED] Omega-3 Fatty Acids (FISH OIL PO) Take by mouth (Patient not taking: Reported on 2/21/2023)       Objective     /64 (BP Location: Left arm, Patient Position: Sitting, Cuff Size: Adult)   Pulse 86   Temp 98 1 °F (36 7 °C) (Tympanic)   Resp 16   Ht 5' 9" (1 753 m)   Wt 67 8 kg (149 lb 6 4 oz)   BMI 22 06 kg/m²     Physical Exam  Constitutional:       Appearance: He is well-developed  HENT:      Head: Normocephalic and atraumatic  Eyes:      General:         Right eye: No discharge  Left eye: No discharge  Conjunctiva/sclera: Conjunctivae normal    Cardiovascular:      Rate and Rhythm: Normal rate and regular rhythm  Heart sounds: Normal heart sounds  No murmur heard  No friction rub  No gallop  Pulmonary:      Effort: Pulmonary effort is normal  No respiratory distress  Breath sounds: Normal breath sounds  No wheezing or rales  Abdominal:      General: Bowel sounds are normal  There is no distension  Palpations: Abdomen is soft  Tenderness: There is no abdominal tenderness  There is no guarding  Musculoskeletal:         General: Tenderness present  Normal range of motion  Cervical back: Normal range of motion and neck supple  No tenderness  Lymphadenopathy:      Cervical: No cervical adenopathy  Neurological:      Mental Status: He is alert         Mordechai Nyhan, MD

## 2023-02-21 NOTE — PROGRESS NOTES
[facility]    NAME: Guru   AGE: 58 y o  SEX: male  : 1960     DATE: 2023     Assessment and Plan:     Problem List Items Addressed This Visit        Endocrine    IFG (impaired fasting glucose)     2023 hgA1C 5 6 normal  Low carb diet  Other    Anxiety     Continue xanax 0 25mg qhs as needed  SE educated pt  PDMP checked and it was appropriate  Relevant Medications    ALPRAZolam (XANAX) 0 25 mg tablet    Chronic lower back pain     Continue flexeril 10mg daily prn  Relevant Medications    cyclobenzaprine (FLEXERIL) 10 mg tablet    Hyperlipidemia - Primary     2023 lipid elevated  ASCVD 10 9% Give simvastatin 20mg qhs  SE educated pt  Relevant Medications    simvastatin (ZOCOR) 20 mg tablet    Other Relevant Orders    Comprehensive metabolic panel    Lipid panel   Other Visit Diagnoses     Annual physical exam              Immunizations and preventive care screenings were discussed with patient today  Appropriate education was printed on patient's after visit summary  Counseling:  Alcohol/drug use: discussed moderation in alcohol intake, the recommendations for healthy alcohol use, and avoidance of illicit drug use  Dental Health: discussed importance of regular tooth brushing, flossing, and dental visits  Injury prevention: discussed safety/seat belts, safety helmets, smoke detectors, carbon dioxide detectors, and smoking near bedding or upholstery  Sexual health: discussed sexually transmitted diseases, partner selection, use of condoms, avoidance of unintended pregnancy, and contraceptive alternatives  · Exercise: the importance of regular exercise/physical activity was discussed  Recommend exercise 3-5 times per week for at least 30 minutes  Depression Screening and Follow-up Plan: Patient was screened for depression during today's encounter   They screened negative with a PHQ-2 score of 2  Tobacco Cessation Counseling: Tobacco cessation counseling was provided  The patient is sincerely urged to quit consumption of tobacco  He is not ready to quit tobacco  Medication options and side effects of medication discussed  Patient refused medication  Return in about 6 months (around 8/21/2023) for Recheck  Chief Complaint:     Chief Complaint   Patient presents with   • Physical Exam     Annual preventative and review labs  Health Maintenance   Topic Date Due   • Pneumococcal Vaccine: Pediatrics (0 to 5 Years) and At-Risk Patients (6 to 59 Years) (2 - PCV) 06/23/2017   • COVID-19 Vaccine (4 - Booster for Pfizer series) 11/02/2022   • Annual Physical  02/16/2023   • Depression Screening  02/21/2024   • BMI: Adult  02/21/2024   • DTaP,Tdap,and Td Vaccines (2 - Td or Tdap) 05/24/2026   • Colorectal Cancer Screening  05/26/2027   • HIV Screening  Completed   • Hepatitis C Screening  Completed   • Influenza Vaccine  Completed   • HIB Vaccine  Aged Out   • IPV Vaccine  Aged Out   • Hepatitis A Vaccine  Aged Out   • Meningococcal ACWY Vaccine  Aged Out   • HPV Vaccine  Aged Out        History of Present Illness:     Adult Annual Physical   Patient here for a comprehensive physical exam  The patient reports see note  Diet and Physical Activity  · Diet/Nutrition: poor diet  · Exercise: walking  Depression Screening  PHQ-2/9 Depression Screening    Little interest or pleasure in doing things: 2 - more than half the days  Feeling down, depressed, or hopeless: 0 - not at all  PHQ-2 Score: 2  PHQ-2 Interpretation: Negative depression screen       General Health  · Sleep: sleeps well  · Hearing: normal - bilateral   · Vision: wears glasses  · Dental: regular dental visits   Health  · Symptoms include: none     Review of Systems:     Review of Systems   Constitutional: Negative for appetite change, chills and fever     HENT: Negative for congestion, ear pain, sinus pain and sore throat  Eyes: Negative for discharge and itching  Respiratory: Negative for apnea, cough, chest tightness, shortness of breath and wheezing  Cardiovascular: Negative for chest pain, palpitations and leg swelling  Gastrointestinal: Negative for abdominal pain, anal bleeding, constipation, diarrhea, nausea and vomiting  Endocrine: Negative for cold intolerance, heat intolerance and polyuria  Genitourinary: Negative for difficulty urinating and dysuria  Musculoskeletal: Positive for back pain  Negative for arthralgias and myalgias  Skin: Negative for rash  Neurological: Negative for dizziness and headaches  Psychiatric/Behavioral: Negative for agitation  Past Medical History:     Past Medical History:   Diagnosis Date   • Anemia     LAST ASSESSED: 6/28/17   • Anxiety    • Arthritis    • High cholesterol    • Rectal bleed    • Varicella       Past Surgical History:     Past Surgical History:   Procedure Laterality Date   • COLONOSCOPY     • ESOPHAGOGASTRODUODENOSCOPY N/A 04/17/2017    Procedure: ESOPHAGOGASTRODUODENOSCOPY (EGD); Surgeon: Junior Asim MD;  Location: [facility]; Service:    • HERNIA REPAIR     • ME COLONOSCOPY FLX DX W/COLLJ SPEC WHEN PFRMD N/A 04/17/2017    Procedure: COLONOSCOPY;  Surgeon: Junior Asim MD;  Location: [facility];   Service: Colorectal   • WISDOM TOOTH EXTRACTION      all 4 removed      Family History:     Family History   Problem Relation Age of Onset   • Colon cancer Mother    • Diabetes Mother    • Heart disease Father    • Allergies Sister         Shellfish   • Lactose intolerance Sister    • Allergies Brother         Envirnmental - cat   • Asthma Brother         History of - unsure if still has asthma   • No Known Problems Brother    • No Known Problems Brother       Social History:     Social History     Socioeconomic History   • Marital status: Single     Spouse name: None   • Number of children: 0   • Years of education: None   • Highest education level: None   Occupational History   • None   Tobacco Use   • Smoking status: Some Days     Packs/day: 0 50     Years: 11 00     Pack years: 5 50     Types: Cigars, Cigarettes     Start date: 0     Last attempt to quit:      Years since quittin 1     Passive exposure: Current   • Smokeless tobacco: Never   • Tobacco comments:     smokes cigars occasionally   Vaping Use   • Vaping Use: Every day   Substance and Sexual Activity   • Alcohol use: Yes     Alcohol/week: 3 0 - 4 0 standard drinks     Types: 3 - 4 Cans of beer per week   • Drug use: No   • Sexual activity: Yes   Other Topics Concern   • None   Social History Narrative    Who lives in your home: alone    What type of home do you live in: Townhouse    Age of your home: 33 yrs    How long have you been living there: 33 yrs    Type of heat: Forced hot air    Type of fuel: Electric    What type of lópez is in your bedroom: Carpet    Do you have the following in or near your home:    Air products: Central air, Window air conditioning, and Dehumidifier in basement    Pests: None    Pets: None    Are pets allowed in bedroom: N/A    Open fields, wooded areas nearby: N/A    Basement: Dry and Finished with dehumidifier    Exposure to second hand smoke: No        Habits:    Caffeine: Current;  Amount: 3 cups/day coffee,  #years 25    Chocolate: Small amount 4 days a week at work    Other:              Social Determinants of Health     Financial Resource Strain: Not on file   Food Insecurity: Not on file   Transportation Needs: Not on file   Physical Activity: Inactive   • Days of Exercise per Week: 0 days   • Minutes of Exercise per Session: 0 min   Stress: Not on file   Social Connections: Not on file   Intimate Partner Violence: Not on file   Housing Stability: Not on file      Current Medications:     Current Outpatient Medications   Medication Sig Dispense Refill   • ALPRAZolam (XANAX) 0 25 mg tablet Take 1 tablet (0 25 mg total) by mouth daily at bedtime as needed for anxiety or sleep 90 tablet 0   • cyclobenzaprine (FLEXERIL) 10 mg tablet Take 1 tablet (10 mg total) by mouth daily as needed for muscle spasms 90 tablet 1   • hydrocortisone 2 5 % lotion Apply topically 2 (two) times a day 118 mL 0   • ketoconazole (NIZORAL) 2 % cream Apply topically daily     • Multiple Vitamins-Minerals (MULTIVITAMIN WITH MINERALS) tablet Take 1 tablet by mouth daily     • simvastatin (ZOCOR) 20 mg tablet Take 1 tablet (20 mg total) by mouth daily at bedtime 30 tablet 5   • ST JOHNS WORT PO Take by mouth     • Turmeric 500 MG CAPS Take by mouth       No current facility-administered medications for this visit  Allergies: Allergies   Allergen Reactions   • Dust Mite Extract Hives   • Other Hives     Allergies to Cat and Mildew      Physical Exam:     /64 (BP Location: Left arm, Patient Position: Sitting, Cuff Size: Adult)   Pulse 86   Temp 98 1 °F (36 7 °C) (Tympanic)   Resp 16   Ht 5' 9" (1 753 m)   Wt 67 8 kg (149 lb 6 4 oz)   BMI 22 06 kg/m²     Physical Exam  Vitals reviewed  Constitutional:       Appearance: Normal appearance  HENT:      Head: Normocephalic and atraumatic  Eyes:      General:         Right eye: No discharge  Left eye: No discharge  Conjunctiva/sclera: Conjunctivae normal    Neck:      Vascular: No carotid bruit  Cardiovascular:      Rate and Rhythm: Normal rate and regular rhythm  Heart sounds: Normal heart sounds  No murmur heard  No friction rub  No gallop  Pulmonary:      Effort: Pulmonary effort is normal  No respiratory distress  Breath sounds: Normal breath sounds  No wheezing or rales  Abdominal:      General: Bowel sounds are normal  There is no distension  Palpations: Abdomen is soft  Tenderness: There is no abdominal tenderness  Musculoskeletal:         General: No swelling, tenderness or deformity  Normal range of motion  Cervical back: Normal range of motion and neck supple  No muscular tenderness  Lymphadenopathy:      Cervical: No cervical adenopathy  Neurological:      Mental Status: He is alert     Psychiatric:         Mood and Affect: Mood normal           Krzysztof Joya MD  [address] [place] [address]

## 2023-08-10 LAB
ALBUMIN SERPL-MCNC: 4.4 G/DL (ref 3.6–5.1)
ALBUMIN/GLOB SERPL: 2 (CALC) (ref 1–2.5)
ALP SERPL-CCNC: 53 U/L (ref 35–144)
ALT SERPL-CCNC: 20 U/L (ref 9–46)
AST SERPL-CCNC: 26 U/L (ref 10–35)
BILIRUB SERPL-MCNC: 0.9 MG/DL (ref 0.2–1.2)
BUN SERPL-MCNC: 12 MG/DL (ref 7–25)
BUN/CREAT SERPL: ABNORMAL (CALC) (ref 6–22)
CALCIUM SERPL-MCNC: 9.5 MG/DL (ref 8.6–10.3)
CHLORIDE SERPL-SCNC: 105 MMOL/L (ref 98–110)
CHOLEST SERPL-MCNC: 179 MG/DL
CHOLEST/HDLC SERPL: 2.5 (CALC)
CO2 SERPL-SCNC: 26 MMOL/L (ref 20–32)
CREAT SERPL-MCNC: 0.75 MG/DL (ref 0.7–1.35)
GFR/BSA.PRED SERPLBLD CYS-BASED-ARV: 102 ML/MIN/1.73M2
GLOBULIN SER CALC-MCNC: 2.2 G/DL (CALC) (ref 1.9–3.7)
GLUCOSE SERPL-MCNC: 105 MG/DL (ref 65–99)
HDLC SERPL-MCNC: 73 MG/DL
LDLC SERPL CALC-MCNC: 89 MG/DL (CALC)
NONHDLC SERPL-MCNC: 106 MG/DL (CALC)
POTASSIUM SERPL-SCNC: 4.4 MMOL/L (ref 3.5–5.3)
PROT SERPL-MCNC: 6.6 G/DL (ref 6.1–8.1)
SODIUM SERPL-SCNC: 138 MMOL/L (ref 135–146)
TRIGL SERPL-MCNC: 80 MG/DL

## 2023-08-22 ENCOUNTER — OFFICE VISIT (OUTPATIENT)
Dept: FAMILY MEDICINE CLINIC | Facility: CLINIC | Age: 63
End: 2023-08-22
Payer: COMMERCIAL

## 2023-08-22 VITALS
SYSTOLIC BLOOD PRESSURE: 118 MMHG | HEART RATE: 83 BPM | OXYGEN SATURATION: 98 % | HEIGHT: 69 IN | BODY MASS INDEX: 22.54 KG/M2 | RESPIRATION RATE: 18 BRPM | DIASTOLIC BLOOD PRESSURE: 76 MMHG | TEMPERATURE: 97.7 F | WEIGHT: 152.2 LBS

## 2023-08-22 DIAGNOSIS — F41.9 ANXIETY: ICD-10-CM

## 2023-08-22 DIAGNOSIS — Z79.899 CONTROLLED SUBSTANCE AGREEMENT SIGNED: ICD-10-CM

## 2023-08-22 DIAGNOSIS — G89.29 CHRONIC MIDLINE LOW BACK PAIN WITHOUT SCIATICA: ICD-10-CM

## 2023-08-22 DIAGNOSIS — R73.01 IFG (IMPAIRED FASTING GLUCOSE): ICD-10-CM

## 2023-08-22 DIAGNOSIS — Z12.5 SCREENING FOR PROSTATE CANCER: ICD-10-CM

## 2023-08-22 DIAGNOSIS — M54.50 CHRONIC MIDLINE LOW BACK PAIN WITHOUT SCIATICA: ICD-10-CM

## 2023-08-22 DIAGNOSIS — M54.50 CHRONIC BILATERAL LOW BACK PAIN WITHOUT SCIATICA: ICD-10-CM

## 2023-08-22 DIAGNOSIS — E78.2 MIXED HYPERLIPIDEMIA: Primary | ICD-10-CM

## 2023-08-22 DIAGNOSIS — G89.29 CHRONIC BILATERAL LOW BACK PAIN WITHOUT SCIATICA: ICD-10-CM

## 2023-08-22 PROCEDURE — 99214 OFFICE O/P EST MOD 30 MIN: CPT | Performed by: FAMILY MEDICINE

## 2023-08-22 RX ORDER — CYCLOBENZAPRINE HCL 10 MG
10 TABLET ORAL DAILY PRN
Qty: 90 TABLET | Refills: 1 | Status: SHIPPED | OUTPATIENT
Start: 2023-08-22 | End: 2023-11-20

## 2023-08-22 RX ORDER — SIMVASTATIN 20 MG
10 TABLET ORAL
Qty: 45 TABLET | Refills: 3
Start: 2023-08-22

## 2023-08-22 RX ORDER — ALPRAZOLAM 0.25 MG/1
0.25 TABLET ORAL
Qty: 90 TABLET | Refills: 0 | Status: SHIPPED | OUTPATIENT
Start: 2023-08-22 | End: 2023-11-20

## 2023-08-22 NOTE — PROGRESS NOTES
Name: Sherri Nguyen      : 1960      MRN: [id number]  Encounter Provider: Gisela Calderón MD  Encounter Date: 2023   Encounter department: [address] [place] [address],[address]     1. Mixed hyperlipidemia  Assessment & Plan:  2023 lipid improved. Low fat diet. Continue simvastatin 10mg qhs. Orders:  -     simvastatin (ZOCOR) 20 mg tablet; Take 0.5 tablets (10 mg total) by mouth daily at bedtime  -     CBC; Future; Expected date: 02/10/2024  -     Comprehensive metabolic panel; Future; Expected date: 02/10/2024  -     Hemoglobin A1C; Future; Expected date: 02/10/2024  -     Lipid panel; Future; Expected date: 02/10/2024  -     PSA, Total Screen; Future; Expected date: 02/10/2024    2. IFG (impaired fasting glucose)  Assessment & Plan:  2023 glucose 105. Advised pt to follow low carb diet. Orders:  -     CBC; Future; Expected date: 02/10/2024  -     Comprehensive metabolic panel; Future; Expected date: 02/10/2024  -     Hemoglobin A1C; Future; Expected date: 02/10/2024  -     Lipid panel; Future; Expected date: 02/10/2024  -     PSA, Total Screen; Future; Expected date: 02/10/2024    3. Chronic bilateral low back pain without sciatica  Assessment & Plan:  Continue flexeril 10mg qhs prn. SE educated pt. Do not use with xanax. Orders:  -     CBC; Future; Expected date: 02/10/2024  -     Comprehensive metabolic panel; Future; Expected date: 02/10/2024  -     Hemoglobin A1C; Future; Expected date: 02/10/2024  -     Lipid panel; Future; Expected date: 02/10/2024  -     PSA, Total Screen; Future; Expected date: 02/10/2024    4. Chronic midline low back pain without sciatica  Assessment & Plan:  Continue flexeril 10mg qhs prn. SE educated pt. Do not use with xanax. Orders:  -     cyclobenzaprine (FLEXERIL) 10 mg tablet; Take 1 tablet (10 mg total) by mouth daily as needed for muscle spasms    5. Anxiety  Assessment & Plan:  Use xanax 0.25mg qhs prn. SE educated pt. Orders:  -     CBC; Future; Expected date: 02/10/2024  -     Comprehensive metabolic panel; Future; Expected date: 02/10/2024  -     Hemoglobin A1C; Future; Expected date: 02/10/2024  -     Lipid panel; Future; Expected date: 02/10/2024  -     PSA, Total Screen; Future; Expected date: 02/10/2024  -     ALPRAZolam (XANAX) 0.25 mg tablet; Take 1 tablet (0.25 mg total) by mouth daily at bedtime as needed for anxiety or sleep    6. Controlled substance agreement signed    7. Screening for prostate cancer      Depression Screening and Follow-up Plan: Patient was screened for depression during today's encounter. They screened negative with a PHQ-2 score of 0. Reviewed lab in 8/2023  CMP ok, glucose 105 slightly elevated  Lipid 179/80/73/89 improved    Got Flu shot yearly.   Got covid19 shots and boosters.   Tdap 2016. Got shingrix.   PSA yearly. Pros and cons educated pt. Colonoscopy 5/2022,  Repeat in 5 years.   RTO in 6 months.         Subjective      HPI     Pt is here by himself.      Hyperlipidemia---He is on simvastatin 10mg qhs and fish oil. Denies SE.      Anxiety---He uses xanax 0.25mg at work night as needed which helped.      Lower Back pain---years. Lower middle back pain.   Constant, 6/10. Tightness pain. No radiating to legs. He uses flexeril daily which helped. Saw pain specialist and chiropractor before, not any more.      Cigar on weekends.   Beer 3 drinks/week.      Denies depression.       Review of Systems   Constitutional: Negative for appetite change, chills and fever. HENT: Negative for congestion, ear pain, sinus pain and sore throat. Eyes: Negative for discharge and itching. Respiratory: Negative for apnea, cough, chest tightness, shortness of breath and wheezing. Cardiovascular: Negative for chest pain, palpitations and leg swelling. Gastrointestinal: Negative for abdominal pain, anal bleeding, constipation, diarrhea, nausea and vomiting.    Endocrine: Negative for cold intolerance, heat intolerance and polyuria. Genitourinary: Negative for difficulty urinating and dysuria. Musculoskeletal: Negative for arthralgias, back pain and myalgias. Skin: Negative for rash. Neurological: Negative for dizziness and headaches. Psychiatric/Behavioral: Negative for agitation. Current Outpatient Medications on File Prior to Visit   Medication Sig   • ketoconazole (NIZORAL) 2 % cream Apply topically daily   • Multiple Vitamins-Minerals (MULTIVITAMIN WITH MINERALS) tablet Take 1 tablet by mouth daily   • Omega-3 Fatty Acids (OMEGA-3 FISH OIL PO) Take by mouth   • ST JOHNS WORT PO Take by mouth   • Turmeric 500 MG CAPS Take by mouth   • [DISCONTINUED] ALPRAZolam (XANAX) 0.25 mg tablet Take 1 tablet (0.25 mg total) by mouth daily at bedtime as needed for anxiety or sleep   • [DISCONTINUED] cyclobenzaprine (FLEXERIL) 10 mg tablet Take 1 tablet (10 mg total) by mouth daily as needed for muscle spasms   • [DISCONTINUED] simvastatin (ZOCOR) 20 mg tablet Take 1 tablet (20 mg total) by mouth daily at bedtime   • [DISCONTINUED] hydrocortisone 2.5 % lotion Apply topically 2 (two) times a day (Patient not taking: Reported on 8/22/2023)       Objective     /76   Pulse 83   Temp 97.7 °F (36.5 °C) (Temporal)   Resp 18   Ht 5' 9" (1.753 m)   Wt 69 kg (152 lb 3.2 oz)   SpO2 98%   BMI 22.48 kg/m²     Physical Exam  Constitutional:       Appearance: He is well-developed. HENT:      Head: Normocephalic and atraumatic. Eyes:      General:         Right eye: No discharge. Left eye: No discharge. Conjunctiva/sclera: Conjunctivae normal.   Cardiovascular:      Rate and Rhythm: Normal rate and regular rhythm. Heart sounds: Normal heart sounds. No murmur heard. No friction rub. No gallop. Pulmonary:      Effort: Pulmonary effort is normal. No respiratory distress. Breath sounds: Normal breath sounds. No wheezing or rales.    Abdominal:      General: Bowel sounds are normal. There is no distension. Palpations: Abdomen is soft. Tenderness: There is no abdominal tenderness. There is no guarding. Musculoskeletal:         General: Normal range of motion. Cervical back: Normal range of motion and neck supple. No tenderness. Right lower leg: No edema. Left lower leg: No edema. Lymphadenopathy:      Cervical: No cervical adenopathy. Neurological:      Mental Status: He is alert.        Nata Mehta MD

## 2024-02-17 LAB
ALBUMIN SERPL-MCNC: 4.4 G/DL (ref 3.6–5.1)
ALBUMIN/GLOB SERPL: 1.9 (CALC) (ref 1–2.5)
ALP SERPL-CCNC: 58 U/L (ref 35–144)
ALT SERPL-CCNC: 23 U/L (ref 9–46)
AST SERPL-CCNC: 27 U/L (ref 10–35)
BILIRUB SERPL-MCNC: 0.6 MG/DL (ref 0.2–1.2)
BUN SERPL-MCNC: 16 MG/DL (ref 7–25)
BUN/CREAT SERPL: NORMAL (CALC) (ref 6–22)
CALCIUM SERPL-MCNC: 9.6 MG/DL (ref 8.6–10.3)
CHLORIDE SERPL-SCNC: 102 MMOL/L (ref 98–110)
CHOLEST SERPL-MCNC: 168 MG/DL
CHOLEST/HDLC SERPL: 2.2 (CALC)
CO2 SERPL-SCNC: 31 MMOL/L (ref 20–32)
CREAT SERPL-MCNC: 0.77 MG/DL (ref 0.7–1.35)
ERYTHROCYTE [DISTWIDTH] IN BLOOD BY AUTOMATED COUNT: 12 % (ref 11–15)
GFR/BSA.PRED SERPLBLD CYS-BASED-ARV: 101 ML/MIN/1.73M2
GLOBULIN SER CALC-MCNC: 2.3 G/DL (CALC) (ref 1.9–3.7)
GLUCOSE SERPL-MCNC: 91 MG/DL (ref 65–99)
HBA1C MFR BLD: 5.5 % OF TOTAL HGB
HCT VFR BLD AUTO: 44.6 % (ref 38.5–50)
HDLC SERPL-MCNC: 75 MG/DL
HGB BLD-MCNC: 14.1 G/DL (ref 13.2–17.1)
LDLC SERPL CALC-MCNC: 79 MG/DL (CALC)
MCH RBC QN AUTO: 29.3 PG (ref 27–33)
MCHC RBC AUTO-ENTMCNC: 31.6 G/DL (ref 32–36)
MCV RBC AUTO: 92.7 FL (ref 80–100)
NONHDLC SERPL-MCNC: 93 MG/DL (CALC)
PLATELET # BLD AUTO: 248 THOUSAND/UL (ref 140–400)
PMV BLD REES-ECKER: 9.9 FL (ref 7.5–12.5)
POTASSIUM SERPL-SCNC: 4.4 MMOL/L (ref 3.5–5.3)
PROT SERPL-MCNC: 6.7 G/DL (ref 6.1–8.1)
PSA SERPL-MCNC: 1.51 NG/ML
RBC # BLD AUTO: 4.81 MILLION/UL (ref 4.2–5.8)
SODIUM SERPL-SCNC: 139 MMOL/L (ref 135–146)
TRIGL SERPL-MCNC: 67 MG/DL
WBC # BLD AUTO: 5.4 THOUSAND/UL (ref 3.8–10.8)

## 2024-02-20 DIAGNOSIS — G89.29 CHRONIC MIDLINE LOW BACK PAIN WITHOUT SCIATICA: ICD-10-CM

## 2024-02-20 DIAGNOSIS — M54.50 CHRONIC MIDLINE LOW BACK PAIN WITHOUT SCIATICA: ICD-10-CM

## 2024-02-20 RX ORDER — CYCLOBENZAPRINE HCL 10 MG
TABLET ORAL
Qty: 90 TABLET | Refills: 1 | Status: SHIPPED | OUTPATIENT
Start: 2024-02-20 | End: 2024-02-29 | Stop reason: SDUPTHER

## 2024-02-21 PROBLEM — Z12.5 SCREENING FOR PROSTATE CANCER: Status: RESOLVED | Noted: 2019-02-21 | Resolved: 2024-02-21

## 2024-02-29 ENCOUNTER — OFFICE VISIT (OUTPATIENT)
Dept: FAMILY MEDICINE CLINIC | Facility: CLINIC | Age: 64
End: 2024-02-29
Payer: COMMERCIAL

## 2024-02-29 VITALS
RESPIRATION RATE: 18 BRPM | TEMPERATURE: 97.7 F | OXYGEN SATURATION: 99 % | DIASTOLIC BLOOD PRESSURE: 84 MMHG | WEIGHT: 147 LBS | HEART RATE: 83 BPM | BODY MASS INDEX: 21.77 KG/M2 | HEIGHT: 69 IN | SYSTOLIC BLOOD PRESSURE: 122 MMHG

## 2024-02-29 DIAGNOSIS — Z23 ENCOUNTER FOR IMMUNIZATION: ICD-10-CM

## 2024-02-29 DIAGNOSIS — Z00.00 ANNUAL PHYSICAL EXAM: ICD-10-CM

## 2024-02-29 DIAGNOSIS — M79.672 LEFT FOOT PAIN: Primary | ICD-10-CM

## 2024-02-29 DIAGNOSIS — Z78.9 ALCOHOL USE: ICD-10-CM

## 2024-02-29 DIAGNOSIS — F41.9 ANXIETY: ICD-10-CM

## 2024-02-29 DIAGNOSIS — M54.50 CHRONIC MIDLINE LOW BACK PAIN WITHOUT SCIATICA: ICD-10-CM

## 2024-02-29 DIAGNOSIS — E78.2 MIXED HYPERLIPIDEMIA: ICD-10-CM

## 2024-02-29 DIAGNOSIS — G89.29 CHRONIC MIDLINE LOW BACK PAIN WITHOUT SCIATICA: ICD-10-CM

## 2024-02-29 DIAGNOSIS — F17.290 CIGAR SMOKER: ICD-10-CM

## 2024-02-29 PROBLEM — F10.90 ALCOHOL USE: Status: ACTIVE | Noted: 2024-02-29

## 2024-02-29 PROCEDURE — 90471 IMMUNIZATION ADMIN: CPT

## 2024-02-29 PROCEDURE — 99396 PREV VISIT EST AGE 40-64: CPT | Performed by: FAMILY MEDICINE

## 2024-02-29 PROCEDURE — 99214 OFFICE O/P EST MOD 30 MIN: CPT | Performed by: FAMILY MEDICINE

## 2024-02-29 PROCEDURE — 90677 PCV20 VACCINE IM: CPT

## 2024-02-29 RX ORDER — ALPRAZOLAM 0.25 MG/1
0.25 TABLET ORAL
Qty: 90 TABLET | Refills: 0 | Status: CANCELLED | OUTPATIENT
Start: 2024-02-29 | End: 2024-05-29

## 2024-02-29 RX ORDER — CYCLOBENZAPRINE HCL 10 MG
10 TABLET ORAL DAILY PRN
Qty: 90 TABLET | Refills: 1 | Status: SHIPPED | OUTPATIENT
Start: 2024-02-29

## 2024-02-29 RX ORDER — ALPRAZOLAM 0.25 MG/1
0.25 TABLET ORAL
Qty: 90 TABLET | Refills: 0 | Status: SHIPPED | OUTPATIENT
Start: 2024-02-29 | End: 2024-05-29

## 2024-02-29 NOTE — PROGRESS NOTES
ADULT ANNUAL PHYSICAL  [facility]    NAME: Watson Cordero  AGE: 63 y.o. SEX: male  : 1960     DATE: 2024     Assessment and Plan:     Problem List Items Addressed This Visit          Other    Anxiety     Continue xanax 0.25mg qhs prn. SE educated pt.          Relevant Medications    ALPRAZolam (XANAX) 0.25 mg tablet    Chronic lower back pain     Continue flexeril 10mg daily prn. SE educated pt. Filled FMLA form.          Relevant Medications    cyclobenzaprine (FLEXERIL) 10 mg tablet    Hyperlipidemia     2024 lipid good. Low fat diet. Continue simvastatin 10mg qhs.          Relevant Orders    Comprehensive metabolic panel    Lipid panel    Left foot pain - Primary     Refer to podiatry.          Relevant Orders    Ambulatory Referral to Podiatry    Cigar smoker     Strongly advised pt to quit!          Other Visit Diagnoses       Encounter for immunization        Relevant Orders    Pneumococcal Conjugate Vaccine 20-valent (Pcv20) (Completed)    Annual physical exam                Reviewed lab in 2024  CBC ok  CMP normal  Lipid 168/67/75/79 good  PSA 1.51 good  HgA1C 5.5 controlled    Got Flu shot yearly.   Got covid19 shots and boosters.   Give PCV20 today.   Tdap 2016.   Got shingrix per pt.   PSA yearly. Pros and cons educated pt.  Colonoscopy 2022,  Repeat in 5 years.   RTO in 6 months.       Immunizations and preventive care screenings were discussed with patient today. Appropriate education was printed on patient's after visit summary.    Discussed risks and benefits of prostate cancer screening. We discussed the controversial history of PSA screening for prostate cancer in the [place] as well as the risk of over detection and over treatment of prostate cancer by way of PSA screening.  The patient understands that PSA blood testing is an imperfect way to screen for prostate cancer and that elevated PSA levels in the blood  may also be caused by infection, inflammation, prostatic trauma or manipulation, urological procedures, or by benign prostatic enlargement.    The role of the digital rectal examination in prostate cancer screening was also discussed and I discussed with him that there is large interobserver variability in the findings of digital rectal examination.    Counseling:  Alcohol/drug use: discussed moderation in alcohol intake, the recommendations for healthy alcohol use, and avoidance of illicit drug use.  Dental Health: discussed importance of regular tooth brushing, flossing, and dental visits.  Injury prevention: discussed safety/seat belts, safety helmets, smoke detectors, carbon dioxide detectors, and smoking near bedding or upholstery.  Sexual health: discussed sexually transmitted diseases, partner selection, use of condoms, avoidance of unintended pregnancy, and contraceptive alternatives.  Exercise: the importance of regular exercise/physical activity was discussed. Recommend exercise 3-5 times per week for at least 30 minutes.       Depression Screening and Follow-up Plan: Patient was screened for depression during today's encounter. They screened negative with a PHQ-2 score of 0.    Tobacco Cessation Counseling: Tobacco cessation counseling was provided. The patient is sincerely urged to quit consumption of tobacco. He is not ready to quit tobacco. Medication options and side effects of medication discussed. Patient refused medication.         Return in about 6 months (around 8/29/2024) for Recheck.     Chief Complaint:     Chief Complaint   Patient presents with    Physical Exam     Annual     Discomfort     Bottom of left foot       History of Present Illness:     Adult Annual Physical   Patient here for a comprehensive physical exam. The patient reports problems - left foot pain .    Pt is here by himself.      Left foot pain for 2 months.   Bottom of left foot pain.   No injury or wound.      Hyperlipidemia---He is on simvastatin 10mg qhs and fish oil. Denies SE.      Anxiety---He uses xanax 0.25mg at work night as needed which helped.      Lower Back pain---years. Lower middle back pain.   Constant, 6/10. Tightness pain.   No radiating to legs.   He uses flexeril daily which helped.   Saw pain specialist and chiropractor before, not any more.   Need FMLA form filled out today.      Cigar on weekends.   Beer 9 drinks/week.   No drugs.      Denies depression.     Diet and Physical Activity  Diet/Nutrition: well balanced diet.   Exercise: walking.      Depression Screening  PHQ-2/9 Depression Screening    Little interest or pleasure in doing things: 0 - not at all  Feeling down, depressed, or hopeless: 0 - not at all  PHQ-2 Score: 0  PHQ-2 Interpretation: Negative depression screen       General Health  Sleep: sleeps well.   Hearing: normal - bilateral.  Vision: wears glasses.   Dental: regular dental visits.        Health  Symptoms include: none       Review of Systems:     Review of Systems   Constitutional:  Negative for appetite change, chills and fever.   HENT:  Negative for congestion, ear pain, sinus pain and sore throat.    Eyes:  Negative for discharge and itching.   Respiratory:  Negative for apnea, cough, chest tightness, shortness of breath and wheezing.    Cardiovascular:  Negative for chest pain, palpitations and leg swelling.   Gastrointestinal:  Negative for abdominal pain, anal bleeding, constipation, diarrhea, nausea and vomiting.   Endocrine: Negative for cold intolerance, heat intolerance and polyuria.   Genitourinary:  Negative for difficulty urinating and dysuria.   Musculoskeletal:  Positive for arthralgias and back pain. Negative for myalgias.   Skin:  Negative for rash.   Neurological:  Negative for dizziness and headaches.   Psychiatric/Behavioral:  Negative for agitation. The patient is nervous/anxious.       Past Medical History:     Past Medical History:   Diagnosis Date     Anemia     LAST ASSESSED: 17    Anxiety     Arthritis     High cholesterol     Rectal bleed     Varicella       Past Surgical History:     Past Surgical History:   Procedure Laterality Date    COLONOSCOPY      ESOPHAGOGASTRODUODENOSCOPY N/A 2017    Procedure: ESOPHAGOGASTRODUODENOSCOPY (EGD);  Surgeon: Peter Hawkins MD;  Location: [facility];  Service:     HERNIA REPAIR      VA COLONOSCOPY FLX DX W/COLLJ SPEC WHEN PFRMD N/A 2017    Procedure: COLONOSCOPY;  Surgeon: Peter Hawkins MD;  Location: [facility];  Service: Colorectal    WISDOM TOOTH EXTRACTION      all 4 removed      Family History:     Family History   Problem Relation Age of Onset    Colon cancer Mother     Diabetes Mother     Heart disease Father     Allergies Sister         Shellfish    Lactose intolerance Sister     Allergies Brother         Envirnmental - cat    Asthma Brother         History of - unsure if still has asthma    No Known Problems Brother     No Known Problems Brother       Social History:     Social History     Socioeconomic History    Marital status: Single     Spouse name: None    Number of children: 0    Years of education: None    Highest education level: None   Occupational History    None   Tobacco Use    Smoking status: Some Days     Current packs/day: 0.00     Average packs/day: 0.5 packs/day for 11.0 years (5.5 ttl pk-yrs)     Types: Cigars, Cigarettes     Start date:      Last attempt to quit:      Years since quittin.1     Passive exposure: Current    Smokeless tobacco: Never    Tobacco comments:     smokes cigars occasionally   Vaping Use    Vaping status: Former   Substance and Sexual Activity    Alcohol use: Yes     Alcohol/week: 3.0 - 4.0 standard drinks of alcohol     Types: 3 - 4 Cans of beer per week    Drug use: No    Sexual activity: Yes   Other Topics Concern    None   Social History Narrative    Who lives in your home: alone    What type of home do you live in: [facility]     Age of your home: [address] yrs    How long have you been living there: 33 yrs    Type of heat: Forced hot air    Type of fuel: Electric    What type of lópez is in your bedroom: Carpet    Do you have the following in or near your home:    Air products: Central air, Window air conditioning, and Dehumidifier in basement    Pests: None    Pets: None    Are pets allowed in bedroom: N/A    Open fields, wooded areas nearby: N/A    Basement: Dry and Finished with dehumidifier    Exposure to second hand smoke: No        Habits:    Caffeine: Current; Amount: 3 cups/day coffee,  #years 25    Chocolate: Small amount 4 days a week at work    Other:              Social Determinants of Health     Financial Resource Strain: Not on file   Food Insecurity: Not on file   Transportation Needs: Not on file   Physical Activity: Inactive (10/25/2022)    Exercise Vital Sign     Days of Exercise per Week: 0 days     Minutes of Exercise per Session: 0 min   Stress: Not on file   Social Connections: Not on file   Intimate Partner Violence: Not on file   Housing Stability: Not on file      Current Medications:     Current Outpatient Medications   Medication Sig Dispense Refill    ALPRAZolam (XANAX) 0.25 mg tablet Take 1 tablet (0.25 mg total) by mouth daily at bedtime as needed for anxiety or sleep 90 tablet 0    cyclobenzaprine (FLEXERIL) 10 mg tablet Take 1 tablet (10 mg total) by mouth daily as needed for muscle spasms 90 tablet 1    ketoconazole (NIZORAL) 2 % cream Apply topically daily as needed for rash      Multiple Vitamins-Minerals (MULTIVITAMIN WITH MINERALS) tablet Take 1 tablet by mouth daily      Omega-3 Fatty Acids (OMEGA-3 FISH OIL PO) Take by mouth      simvastatin (ZOCOR) 20 mg tablet Take 0.5 tablets (10 mg total) by mouth daily at bedtime (Patient taking differently: Take 10 mg by mouth daily as needed) 45 tablet 3     No current facility-administered medications for this visit.      Allergies:     Allergies   Allergen  "Reactions    Dust Mite Extract Hives    Other Hives     Allergies to Cat and Mildew      Physical Exam:     /84 (BP Location: Right arm, Patient Position: Sitting, Cuff Size: Adult)   Pulse 83   Temp 97.7 °F (36.5 °C) (Tympanic)   Resp 18   Ht 5' 9\" (1.753 m)   Wt 66.7 kg (147 lb)   SpO2 99%   BMI 21.71 kg/m²     Physical Exam  Vitals reviewed.   Constitutional:       Appearance: Normal appearance.   HENT:      Head: Normocephalic and atraumatic.   Eyes:      General:         Right eye: No discharge.         Left eye: No discharge.      Conjunctiva/sclera: Conjunctivae normal.   Neck:      Vascular: No carotid bruit.   Cardiovascular:      Rate and Rhythm: Normal rate and regular rhythm.      Heart sounds: Normal heart sounds. No murmur heard.     No friction rub. No gallop.   Pulmonary:      Effort: Pulmonary effort is normal. No respiratory distress.      Breath sounds: Normal breath sounds. No wheezing or rales.   Abdominal:      General: Bowel sounds are normal. There is no distension.      Palpations: Abdomen is soft.      Tenderness: There is no abdominal tenderness.   Musculoskeletal:         General: Tenderness present. No swelling or deformity. Normal range of motion.      Cervical back: Normal range of motion and neck supple. No muscular tenderness.      Comments: Left foot callus   Lymphadenopathy:      Cervical: No cervical adenopathy.   Neurological:      Mental Status: He is alert.   Psychiatric:         Mood and Affect: Mood normal.          Martín Collins MD  [facility]    "

## 2024-04-03 DIAGNOSIS — E78.2 MIXED HYPERLIPIDEMIA: ICD-10-CM

## 2024-04-03 RX ORDER — SIMVASTATIN 20 MG
20 TABLET ORAL
Qty: 90 TABLET | Refills: 1 | Status: SHIPPED | OUTPATIENT
Start: 2024-04-03

## 2024-04-10 ENCOUNTER — TELEPHONE (OUTPATIENT)
Dept: FAMILY MEDICINE CLINIC | Facility: CLINIC | Age: 64
End: 2024-04-10

## 2024-04-10 DIAGNOSIS — M54.50 CHRONIC MIDLINE LOW BACK PAIN WITHOUT SCIATICA: ICD-10-CM

## 2024-04-10 DIAGNOSIS — F41.9 ANXIETY: ICD-10-CM

## 2024-04-10 DIAGNOSIS — G89.29 CHRONIC MIDLINE LOW BACK PAIN WITHOUT SCIATICA: ICD-10-CM

## 2024-04-10 RX ORDER — ALPRAZOLAM 0.25 MG/1
0.25 TABLET ORAL
Qty: 90 TABLET | Refills: 0 | Status: SHIPPED | OUTPATIENT
Start: 2024-04-10 | End: 2024-07-09

## 2024-04-10 RX ORDER — CYCLOBENZAPRINE HCL 10 MG
10 TABLET ORAL DAILY PRN
Qty: 90 TABLET | Refills: 1 | Status: SHIPPED | OUTPATIENT
Start: 2024-04-10

## 2024-04-10 NOTE — TELEPHONE ENCOUNTER
Patient called to ask to confirm if provider recommends to stop taking smivastatin. Request call back to confirm. Patient also request refills for Aprazolam 25 mg and Cyclobenzaprine 10 mg.

## 2024-04-10 NOTE — TELEPHONE ENCOUNTER
Refills done. Pt may stop simvastatin to see if his muscle pain get better. If no improving, go back to simvastatin.

## 2024-04-23 DIAGNOSIS — Z12.5 SCREENING FOR PROSTATE CANCER: Primary | ICD-10-CM

## 2024-04-29 ENCOUNTER — OFFICE VISIT (OUTPATIENT)
Dept: PODIATRY | Facility: CLINIC | Age: 64
End: 2024-04-29
Payer: COMMERCIAL

## 2024-04-29 VITALS
WEIGHT: 147 LBS | DIASTOLIC BLOOD PRESSURE: 82 MMHG | SYSTOLIC BLOOD PRESSURE: 142 MMHG | BODY MASS INDEX: 21.77 KG/M2 | RESPIRATION RATE: 18 BRPM | HEIGHT: 69 IN | HEART RATE: 69 BPM

## 2024-04-29 DIAGNOSIS — M79.672 LEFT FOOT PAIN: ICD-10-CM

## 2024-04-29 DIAGNOSIS — L85.2 KERATODERMA PUNCTATA: Primary | ICD-10-CM

## 2024-04-29 PROCEDURE — 99242 OFF/OP CONSLTJ NEW/EST SF 20: CPT | Performed by: PODIATRIST

## 2024-04-29 NOTE — PROGRESS NOTES
"Assessment/Plan:    Trimmed hyperkeratotic lesion plantar aspect left foot distal to fifth metatarsal head revealing a punctate hyperkeratotic lesion.  Discussed treatment options.  Patient is unable to visualize this lesion due to its location.  For this reason, recommended periodic palliative care when painful.  Reappoint as needed  No problem-specific Assessment & Plan notes found for this encounter.       Diagnoses and all orders for this visit:    Keratoderma punctata    Left foot pain  -     Ambulatory Referral to Podiatry          Subjective:      Patient ID: Watson Cordero is a 63 y.o. male.    HPI    Patient, a 63-year-old male in good health presents with a painful lesion on the bottom of the left foot.  This lesion developed without recalled trauma approximately 2 months ago.  No similar lesion is noted on the right foot.    The following portions of the patient's history were reviewed and updated as appropriate: allergies, current medications, past family history, past medical history, past social history, past surgical history, and problem list.    Review of Systems   Gastrointestinal: Negative.    Musculoskeletal:  Positive for gait problem.   Psychiatric/Behavioral: Negative.               Objective:      /82   Pulse 69   Resp 18   Ht 5' 9\" (1.753 m)   Wt 66.7 kg (147 lb)   BMI 21.71 kg/m²          Physical Exam  Constitutional:       Appearance: Normal appearance.   Cardiovascular:      Pulses: Normal pulses.   Musculoskeletal:         General: Normal range of motion.   Skin:     Findings: Lesion present.      Comments: Punctate hyperkeratotic lesion distal to fifth metatarsal head left foot.  No evidence of verruca.               "

## 2024-08-23 LAB
ALBUMIN SERPL-MCNC: 4.3 G/DL (ref 3.6–5.1)
ALBUMIN/GLOB SERPL: 1.7 (CALC) (ref 1–2.5)
ALP SERPL-CCNC: 55 U/L (ref 35–144)
ALT SERPL-CCNC: 24 U/L (ref 9–46)
AST SERPL-CCNC: 23 U/L (ref 10–35)
BILIRUB SERPL-MCNC: 1 MG/DL (ref 0.2–1.2)
BUN SERPL-MCNC: 13 MG/DL (ref 7–25)
BUN/CREAT SERPL: 19 (CALC) (ref 6–22)
CALCIUM SERPL-MCNC: 9.3 MG/DL (ref 8.6–10.3)
CHLORIDE SERPL-SCNC: 103 MMOL/L (ref 98–110)
CHOLEST SERPL-MCNC: 215 MG/DL
CHOLEST/HDLC SERPL: 2.9 (CALC)
CO2 SERPL-SCNC: 28 MMOL/L (ref 20–32)
CREAT SERPL-MCNC: 0.69 MG/DL (ref 0.7–1.35)
GFR/BSA.PRED SERPLBLD CYS-BASED-ARV: 104 ML/MIN/1.73M2
GLOBULIN SER CALC-MCNC: 2.5 G/DL (CALC) (ref 1.9–3.7)
GLUCOSE SERPL-MCNC: 106 MG/DL (ref 65–99)
HDLC SERPL-MCNC: 73 MG/DL
LDLC SERPL CALC-MCNC: 127 MG/DL (CALC)
NONHDLC SERPL-MCNC: 142 MG/DL (CALC)
POTASSIUM SERPL-SCNC: 4.4 MMOL/L (ref 3.5–5.3)
PROT SERPL-MCNC: 6.8 G/DL (ref 6.1–8.1)
SODIUM SERPL-SCNC: 139 MMOL/L (ref 135–146)
TRIGL SERPL-MCNC: 61 MG/DL

## 2024-08-29 ENCOUNTER — OFFICE VISIT (OUTPATIENT)
Dept: FAMILY MEDICINE CLINIC | Facility: CLINIC | Age: 64
End: 2024-08-29
Payer: COMMERCIAL

## 2024-08-29 ENCOUNTER — TELEPHONE (OUTPATIENT)
Dept: FAMILY MEDICINE CLINIC | Facility: CLINIC | Age: 64
End: 2024-08-29

## 2024-08-29 VITALS
HEART RATE: 82 BPM | SYSTOLIC BLOOD PRESSURE: 124 MMHG | RESPIRATION RATE: 18 BRPM | HEIGHT: 69 IN | BODY MASS INDEX: 22.16 KG/M2 | DIASTOLIC BLOOD PRESSURE: 72 MMHG | OXYGEN SATURATION: 98 % | WEIGHT: 149.6 LBS | TEMPERATURE: 97.7 F

## 2024-08-29 DIAGNOSIS — G89.29 CHRONIC BILATERAL LOW BACK PAIN WITHOUT SCIATICA: ICD-10-CM

## 2024-08-29 DIAGNOSIS — L70.0 ACNE VULGARIS: Primary | ICD-10-CM

## 2024-08-29 DIAGNOSIS — M54.50 CHRONIC BILATERAL LOW BACK PAIN WITHOUT SCIATICA: ICD-10-CM

## 2024-08-29 DIAGNOSIS — Z12.5 SCREENING FOR PROSTATE CANCER: ICD-10-CM

## 2024-08-29 DIAGNOSIS — E78.2 MIXED HYPERLIPIDEMIA: Primary | ICD-10-CM

## 2024-08-29 DIAGNOSIS — F41.9 ANXIETY: ICD-10-CM

## 2024-08-29 DIAGNOSIS — R73.01 IFG (IMPAIRED FASTING GLUCOSE): ICD-10-CM

## 2024-08-29 DIAGNOSIS — L70.0 ACNE VULGARIS: ICD-10-CM

## 2024-08-29 DIAGNOSIS — Z79.899 CONTROLLED SUBSTANCE AGREEMENT SIGNED: ICD-10-CM

## 2024-08-29 PROCEDURE — 99214 OFFICE O/P EST MOD 30 MIN: CPT | Performed by: FAMILY MEDICINE

## 2024-08-29 RX ORDER — ALPRAZOLAM 0.25 MG
0.25 TABLET ORAL
Qty: 90 TABLET | Refills: 0 | Status: SHIPPED | OUTPATIENT
Start: 2024-09-13 | End: 2024-12-12

## 2024-08-29 RX ORDER — MINOCYCLINE HYDROCHLORIDE 50 MG/1
50 CAPSULE ORAL 2 TIMES DAILY
Qty: 180 CAPSULE | Refills: 0 | Status: SHIPPED | OUTPATIENT
Start: 2024-08-29 | End: 2024-11-27

## 2024-08-29 RX ORDER — DOXYCYCLINE 50 MG/1
50 TABLET ORAL 2 TIMES DAILY
Qty: 180 TABLET | Refills: 0 | Status: SHIPPED | OUTPATIENT
Start: 2024-08-29 | End: 2024-08-29 | Stop reason: CLARIF

## 2024-08-29 NOTE — TELEPHONE ENCOUNTER
Patient states he doesn't want doxycycline 50 mg, he wanted Minocycline 50 mg to [address].  Please cancel the original script for doxycycline.

## 2024-08-29 NOTE — PROGRESS NOTES
Ambulatory Visit  Name: Watson Cordero      : 1960      MRN: [id number]  Encounter Provider: Martín Collins MD  Encounter Date: 2024   Encounter department: [facility]    Chief Complaint   Patient presents with    Follow-up     6 month      Health Maintenance   Topic Date Due    Hepatitis A Vaccine (1 of 2 - Risk 2-dose series) Never done    RSV Vaccine Age 60+ Years (1 - 1-dose 60+ series) Never done    COVID-19 Vaccine (5 - - season) 2023    Influenza Vaccine (1) 2024    Annual Physical  2025    Depression Screening  2025    DTaP,Tdap,and Td Vaccines (2 - Td or Tdap) 2026    Colorectal Cancer Screening  2027    HIV Screening  Completed    Hepatitis C Screening  Completed    Zoster Vaccine  Completed    Pneumococcal Vaccine: Pediatrics (0 to 5 Years) and At-Risk Patients (6 to 64 Years)  Completed    RSV Vaccine age 0-20 Months  Aged Out    HIB Vaccine  Aged Out    IPV Vaccine  Aged Out    Meningococcal ACWY Vaccine  Aged Out    HPV Vaccine  Aged Out       Assessment & Plan   1. Mixed hyperlipidemia  Assessment & Plan:  2024 lipid elevated. ASCVD 15.8%. Advised pt to restart simvastatin 20mg qhs.   Orders:  -     CBC; Future; Expected date: 2025  -     Comprehensive metabolic panel; Future; Expected date: 2025  -     Hemoglobin A1C; Future; Expected date: 2025  -     Lipid panel; Future; Expected date: 2025  -     PSA, Total Screen; Future; Expected date: 2025  -     CBC  -     Comprehensive metabolic panel  -     Lipid panel  -     PSA, Total Screen  2. IFG (impaired fasting glucose)  Assessment & Plan:  Low carb diet.   Orders:  -     CBC; Future; Expected date: 2025  -     Comprehensive metabolic panel; Future; Expected date: 2025  -     Hemoglobin A1C; Future; Expected date: 2025  -     Lipid panel; Future; Expected date: 2025  -     PSA, Total Screen; Future; Expected date:  02/20/2025  -     CBC  -     Comprehensive metabolic panel  -     Lipid panel  -     PSA, Total Screen  3. Anxiety  Assessment & Plan:  He is on xanax 0.25mg daily prn. SE educated pt. Do not use it with alcohol or muscle relaxant.   Orders:  -     CBC; Future; Expected date: 02/20/2025  -     Comprehensive metabolic panel; Future; Expected date: 02/20/2025  -     Hemoglobin A1C; Future; Expected date: 02/20/2025  -     Lipid panel; Future; Expected date: 02/20/2025  -     PSA, Total Screen; Future; Expected date: 02/20/2025  -     CBC  -     Comprehensive metabolic panel  -     Lipid panel  -     PSA, Total Screen  -     ALPRAZolam (XANAX) 0.25 mg tablet; Take 1 tablet (0.25 mg total) by mouth daily at bedtime as needed for anxiety or sleep Do not start before September 13, 2024.  4. Controlled substance agreement signed  Assessment & Plan:  Controlled substance agreement signed today.   5. Chronic bilateral low back pain without sciatica  Assessment & Plan:  He is on flexeril 10mg daily prn. SE educated pt.   6. Acne vulgaris  Assessment & Plan:  Continue doxycycline 50mg bid prn. SE educated pt.   Orders:  -     doxycycline (ADOXA) 50 MG tablet; Take 1 tablet (50 mg total) by mouth 2 (two) times a day  7. Screening for prostate cancer  -     CBC; Future; Expected date: 02/20/2025  -     Comprehensive metabolic panel; Future; Expected date: 02/20/2025  -     Hemoglobin A1C; Future; Expected date: 02/20/2025  -     Lipid panel; Future; Expected date: 02/20/2025  -     PSA, Total Screen; Future; Expected date: 02/20/2025  -     CBC  -     Comprehensive metabolic panel  -     Lipid panel  -     PSA, Total Screen      Depression Screening and Follow-up Plan: Patient was screened for depression during today's encounter. They screened negative with a PHQ-2 score of 0.    Tobacco Cessation Counseling: Tobacco cessation counseling was provided. The patient is sincerely urged to quit consumption of tobacco. He is not ready  to quit tobacco. Medication options and side effects of medication discussed. Patient refused medication.       Reviewed lab in 8/2024  CMP ok  Lipid 215/61/73/127 elevated    Got Flu shot yearly.   Got covid19 shots and boosters.   Got PCV20.   Tdap 2016.   Got shingrix per pt.   PSA yearly. Pros and cons educated pt.  Colonoscopy 5/2022,  Repeat in 5 years.   RTO in 6 months.       History of Present Illness     HPI    Pt is here by himself.      Hyperlipidemia---He stopped simvastatin 10mg qhs for a while.   Eat healthy.    Father had MI at age of 66.      Anxiety---He uses xanax 0.25mg at work night as needed which helped.      Lower Back pain---years. Lower middle back pain. 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   He uses flexeril daily which helped.   Saw pain specialist and chiropractor before, not any more.     Acne---come and go. He is on doxycycline 50mg bid as needed from previous PCP. Need refills.      Cigar on weekends.   Beer 9 drinks/week.   No drugs.       Denies depression.       Review of Systems   Constitutional:  Negative for appetite change, chills and fever.   HENT:  Negative for congestion, ear pain, sinus pain and sore throat.    Eyes:  Negative for discharge and itching.   Respiratory:  Negative for apnea, cough, chest tightness, shortness of breath and wheezing.    Cardiovascular:  Negative for chest pain, palpitations and leg swelling.   Gastrointestinal:  Negative for abdominal pain, anal bleeding, constipation, diarrhea, nausea and vomiting.   Endocrine: Negative for cold intolerance, heat intolerance and polyuria.   Genitourinary:  Negative for difficulty urinating and dysuria.   Musculoskeletal:  Positive for back pain. Negative for arthralgias and myalgias.   Skin:  Positive for rash.   Neurological:  Negative for dizziness and headaches.   Psychiatric/Behavioral:  Negative for agitation.        Objective     /72   Pulse 82   Temp 97.7 °F (36.5 °C) (Temporal)   Resp  "18   Ht 5' 9\" (1.753 m)   Wt 67.9 kg (149 lb 9.6 oz)   SpO2 98%   BMI 22.09 kg/m²     Physical Exam  Constitutional:       Appearance: He is well-developed.   HENT:      Head: Normocephalic and atraumatic.   Eyes:      General:         Right eye: No discharge.         Left eye: No discharge.      Conjunctiva/sclera: Conjunctivae normal.   Cardiovascular:      Rate and Rhythm: Normal rate and regular rhythm.      Heart sounds: Normal heart sounds. No murmur heard.     No friction rub. No gallop.   Pulmonary:      Effort: Pulmonary effort is normal. No respiratory distress.      Breath sounds: Normal breath sounds. No wheezing or rales.   Abdominal:      General: Bowel sounds are normal. There is no distension.      Palpations: Abdomen is soft.      Tenderness: There is no abdominal tenderness. There is no guarding.   Musculoskeletal:         General: Normal range of motion.      Cervical back: Normal range of motion and neck supple.      Right lower leg: No edema.      Left lower leg: No edema.   Skin:     Findings: Rash present.      Comments: Acne on face   Neurological:      Mental Status: He is alert.               "

## 2024-12-03 DIAGNOSIS — M54.50 CHRONIC MIDLINE LOW BACK PAIN WITHOUT SCIATICA: ICD-10-CM

## 2024-12-03 DIAGNOSIS — G89.29 CHRONIC MIDLINE LOW BACK PAIN WITHOUT SCIATICA: ICD-10-CM

## 2024-12-05 RX ORDER — CYCLOBENZAPRINE HCL 10 MG
TABLET ORAL
Qty: 90 TABLET | Refills: 1 | Status: SHIPPED | OUTPATIENT
Start: 2024-12-05

## 2025-01-30 ENCOUNTER — TELEPHONE (OUTPATIENT)
Dept: FAMILY MEDICINE CLINIC | Facility: CLINIC | Age: 65
End: 2025-01-30

## 2025-01-31 NOTE — TELEPHONE ENCOUNTER
Form signed and completed by provider. Charges entered. Left voicemail to notify patient form is complete and made aware of form fee. Forms available at .

## 2025-02-03 NOTE — TELEPHONE ENCOUNTER
Patient called stated that he received the message about the [employer] forms being completed and is aware of the fee that will need to be paid.    Patient stated that he will be at the office a little after 5pm to  the paperwork.    Patient just also wanted to double check that the paperwork has been faxed over to the number that was on the form.    Please advise.

## 2025-02-03 NOTE — TELEPHONE ENCOUNTER
Returned call and notified patient office closes at 5pm. Patient will  forms on Thursday as office is open late.

## 2025-03-15 LAB
ALBUMIN SERPL-MCNC: 4.4 G/DL (ref 3.6–5.1)
ALBUMIN/GLOB SERPL: 1.8 (CALC) (ref 1–2.5)
ALP SERPL-CCNC: 59 U/L (ref 35–144)
ALT SERPL-CCNC: 22 U/L (ref 9–46)
AST SERPL-CCNC: 25 U/L (ref 10–35)
BILIRUB SERPL-MCNC: 0.7 MG/DL (ref 0.2–1.2)
BUN SERPL-MCNC: 12 MG/DL (ref 7–25)
BUN/CREAT SERPL: ABNORMAL (CALC) (ref 6–22)
CALCIUM SERPL-MCNC: 9.3 MG/DL (ref 8.6–10.3)
CHLORIDE SERPL-SCNC: 103 MMOL/L (ref 98–110)
CHOLEST SERPL-MCNC: 173 MG/DL
CHOLEST/HDLC SERPL: 2.2 (CALC)
CO2 SERPL-SCNC: 29 MMOL/L (ref 20–32)
CREAT SERPL-MCNC: 0.75 MG/DL (ref 0.7–1.35)
ERYTHROCYTE [DISTWIDTH] IN BLOOD BY AUTOMATED COUNT: 11.9 % (ref 11–15)
GFR/BSA.PRED SERPLBLD CYS-BASED-ARV: 101 ML/MIN/1.73M2
GLOBULIN SER CALC-MCNC: 2.4 G/DL (CALC) (ref 1.9–3.7)
GLUCOSE SERPL-MCNC: 110 MG/DL (ref 65–99)
HCT VFR BLD AUTO: 42.9 % (ref 38.5–50)
HDLC SERPL-MCNC: 79 MG/DL
HGB BLD-MCNC: 14.1 G/DL (ref 13.2–17.1)
LDLC SERPL CALC-MCNC: 81 MG/DL (CALC)
MCH RBC QN AUTO: 30.3 PG (ref 27–33)
MCHC RBC AUTO-ENTMCNC: 32.9 G/DL (ref 32–36)
MCV RBC AUTO: 92.3 FL (ref 80–100)
NONHDLC SERPL-MCNC: 94 MG/DL (CALC)
PLATELET # BLD AUTO: 238 THOUSAND/UL (ref 140–400)
PMV BLD REES-ECKER: 9.8 FL (ref 7.5–12.5)
POTASSIUM SERPL-SCNC: 4.5 MMOL/L (ref 3.5–5.3)
PROT SERPL-MCNC: 6.8 G/DL (ref 6.1–8.1)
PSA SERPL-MCNC: 0.66 NG/ML
RBC # BLD AUTO: 4.65 MILLION/UL (ref 4.2–5.8)
SODIUM SERPL-SCNC: 141 MMOL/L (ref 135–146)
TRIGL SERPL-MCNC: 53 MG/DL
WBC # BLD AUTO: 8.9 THOUSAND/UL (ref 3.8–10.8)

## 2025-03-17 ENCOUNTER — RESULTS FOLLOW-UP (OUTPATIENT)
Dept: FAMILY MEDICINE CLINIC | Facility: CLINIC | Age: 65
End: 2025-03-17

## 2025-03-28 ENCOUNTER — OFFICE VISIT (OUTPATIENT)
Dept: FAMILY MEDICINE CLINIC | Facility: CLINIC | Age: 65
End: 2025-03-28
Payer: COMMERCIAL

## 2025-03-28 VITALS
SYSTOLIC BLOOD PRESSURE: 130 MMHG | WEIGHT: 144 LBS | DIASTOLIC BLOOD PRESSURE: 70 MMHG | TEMPERATURE: 98.4 F | HEIGHT: 69 IN | OXYGEN SATURATION: 98 % | BODY MASS INDEX: 21.33 KG/M2 | HEART RATE: 98 BPM | RESPIRATION RATE: 16 BRPM

## 2025-03-28 DIAGNOSIS — F17.290 CIGAR SMOKER: ICD-10-CM

## 2025-03-28 DIAGNOSIS — R73.01 IFG (IMPAIRED FASTING GLUCOSE): ICD-10-CM

## 2025-03-28 DIAGNOSIS — E78.2 MIXED HYPERLIPIDEMIA: Primary | ICD-10-CM

## 2025-03-28 DIAGNOSIS — Z00.00 ANNUAL PHYSICAL EXAM: ICD-10-CM

## 2025-03-28 DIAGNOSIS — G89.29 CHRONIC BILATERAL LOW BACK PAIN WITHOUT SCIATICA: ICD-10-CM

## 2025-03-28 DIAGNOSIS — M54.50 CHRONIC BILATERAL LOW BACK PAIN WITHOUT SCIATICA: ICD-10-CM

## 2025-03-28 DIAGNOSIS — F10.90 ALCOHOL USE: ICD-10-CM

## 2025-03-28 DIAGNOSIS — F41.9 ANXIETY: ICD-10-CM

## 2025-03-28 DIAGNOSIS — Z78.9 ALCOHOL USE: ICD-10-CM

## 2025-03-28 PROCEDURE — 99214 OFFICE O/P EST MOD 30 MIN: CPT | Performed by: FAMILY MEDICINE

## 2025-03-28 PROCEDURE — 99396 PREV VISIT EST AGE 40-64: CPT | Performed by: FAMILY MEDICINE

## 2025-03-28 RX ORDER — ALPRAZOLAM 0.25 MG
0.25 TABLET ORAL
Qty: 90 TABLET | Refills: 0 | Status: SHIPPED | OUTPATIENT
Start: 2025-03-28 | End: 2025-06-26

## 2025-03-28 RX ORDER — SIMVASTATIN 10 MG
10 TABLET ORAL
Qty: 100 TABLET | Refills: 3 | Status: SHIPPED | OUTPATIENT
Start: 2025-03-28

## 2025-03-28 NOTE — PATIENT INSTRUCTIONS
"Patient Education     Routine physical for adults   The Basics   Written by the doctors and editors at [facility]   What is a physical? -- A physical is a routine visit, or \"check-up,\" with your doctor. You might also hear it called a \"wellness visit\" or \"preventive visit.\"  During each visit, the doctor will:   Ask about your physical and mental health   Ask about your habits, behaviors, and lifestyle   Do an exam   Give you vaccines if needed   Talk to you about any medicines you take   Give advice about your health   Answer your questions  Getting regular check-ups is an important part of taking care of your health. It can help your doctor find and treat any problems you have. But it's also important for preventing health problems.  A routine physical is different from a \"sick visit.\" A sick visit is when you see a doctor because of a health concern or problem. Since physicals are scheduled ahead of time, you can think about what you want to ask the doctor.  How often should I get a physical? -- It depends on your age and health. In general, for people age 21 years and older:   If you are younger than 50 years, you might be able to get a physical every 3 years.   If you are 50 years or older, your doctor might recommend a physical every year.  If you have an ongoing health condition, like diabetes or high blood pressure, your doctor will probably want to see you more often.  What happens during a physical? -- In general, each visit will include:   Physical exam - The doctor or nurse will check your height, weight, heart rate, and blood pressure. They will also look at your eyes and ears. They will ask about how you are feeling and whether you have any symptoms that bother you.   Medicines - It's a good idea to bring a list of all the medicines you take to each doctor visit. Your doctor will talk to you about your medicines and answer any questions. Tell them if you are having any side effects that bother you. You " "should also tell them if you are having trouble paying for any of your medicines.   Habits and behaviors - This includes:   Your diet   Your exercise habits   Whether you smoke, drink alcohol, or use drugs   Whether you are sexually active   Whether you feel safe at home  Your doctor will talk to you about things you can do to improve your health and lower your risk of health problems. They will also offer help and support. For example, if you want to quit smoking, they can give you advice and might prescribe medicines. If you want to improve your diet or get more physical activity, they can help you with this, too.   Lab tests, if needed - The tests you get will depend on your age and situation. For example, your doctor might want to check your:   Cholesterol   Blood sugar   Iron level   Vaccines - The recommended vaccines will depend on your age, health, and what vaccines you already had. Vaccines are very important because they can prevent certain serious or deadly infections.   Discussion of screening - \"Screening\" means checking for diseases or other health problems before they cause symptoms. Your doctor can recommend screening based on your age, risk, and preferences. This might include tests to check for:   Cancer, such as breast, prostate, cervical, ovarian, colorectal, prostate, lung, or skin cancer   Sexually transmitted infections, such as chlamydia and gonorrhea   Mental health conditions like depression and anxiety  Your doctor will talk to you about the different types of screening tests. They can help you decide which screenings to have. They can also explain what the results might mean.   Answering questions - The physical is a good time to ask the doctor or nurse questions about your health. If needed, they can refer you to other doctors or specialists, too.  Adults older than 65 years often need other care, too. As you get older, your doctor will talk to you about:   How to prevent falling at " home   Hearing or vision tests   Memory testing   How to take your medicines safely   Making sure that you have the help and support you need at home  All topics are updated as new evidence becomes available and our peer review process is complete.  This topic retrieved from [employer] assistant on: May 02, 2024.  Topic 118773 Version 1.0  Release: 32.4.3 - C32.122  © 2024 UpToDate, Inc. and/or its affiliates. All rights reserved.  Consumer Information Use and Disclaimer   Disclaimer: This generalized information is a limited summary of diagnosis, treatment, and/or medication information. It is not meant to be comprehensive and should be used as a tool to help the user understand and/or assess potential diagnostic and treatment options. It does NOT include all information about conditions, treatments, medications, side effects, or risks that may apply to a specific patient. It is not intended to be medical advice or a substitute for the medical advice, diagnosis, or treatment of a health care provider based on the health care provider's examination and assessment of a patient's specific and unique circumstances. Patients must speak with a health care provider for complete information about their health, medical questions, and treatment options, including any risks or benefits regarding use of medications. This information does not endorse any treatments or medications as safe, effective, or approved for treating a specific patient. UpToDate, Inc. and its affiliates disclaim any warranty or liability relating to this information or the use thereof.The use of this information is governed by the Terms of Use, available at https://www.woltersBuzzientuwer.com/en/know/clinical-effectiveness-terms. 2024© UpToDate, Inc. and its affiliates and/or licensors. All rights reserved.  Copyright   © 2024 UpToDate, Inc. and/or its affiliates. All rights reserved.

## 2025-03-28 NOTE — ASSESSMENT & PLAN NOTE
Continue xanax 0.25mg qhs prn. SE educated pt. Do not use it with flexeril or alcohol etc.   Orders:    Hemoglobin A1C With EAG; Future    Lipid panel; Future    Comprehensive metabolic panel; Future    ALPRAZolam (XANAX) 0.25 mg tablet; Take 1 tablet (0.25 mg total) by mouth daily at bedtime as needed for anxiety or sleep

## 2025-03-28 NOTE — ASSESSMENT & PLAN NOTE
Low carb diet.   Orders:    Hemoglobin A1C With EAG; Future    Lipid panel; Future    Comprehensive metabolic panel; Future

## 2025-03-28 NOTE — ASSESSMENT & PLAN NOTE
Continue flexeril 10mg daily prn.   Orders:    Hemoglobin A1C With EAG; Future    Lipid panel; Future    Comprehensive metabolic panel; Future

## 2025-03-28 NOTE — ASSESSMENT & PLAN NOTE
Low fat diet. Continue simvastatin 10mg qhs.   Orders:    simvastatin (ZOCOR) 10 mg tablet; Take 1 tablet (10 mg total) by mouth daily at bedtime    Hemoglobin A1C With EAG; Future    Lipid panel; Future    Comprehensive metabolic panel; Future

## 2025-03-28 NOTE — PROGRESS NOTES
Adult Annual Physical  Name: Watson Cordero      : 1960      MRN: [id number]  Encounter Provider: Martín Collins MD  Encounter Date: 3/28/2025   Encounter department: [facility]    Assessment & Plan  Mixed hyperlipidemia  Low fat diet. Continue simvastatin 10mg qhs.   Orders:    simvastatin (ZOCOR) 10 mg tablet; Take 1 tablet (10 mg total) by mouth daily at bedtime    Hemoglobin A1C With EAG; Future    Lipid panel; Future    Comprehensive metabolic panel; Future    Chronic bilateral low back pain without sciatica  Continue flexeril 10mg daily prn.   Orders:    Hemoglobin A1C With EAG; Future    Lipid panel; Future    Comprehensive metabolic panel; Future    IFG (impaired fasting glucose)  Low carb diet.   Orders:    Hemoglobin A1C With EAG; Future    Lipid panel; Future    Comprehensive metabolic panel; Future    Anxiety  Continue xanax 0.25mg qhs prn. SE educated pt. Do not use it with flexeril or alcohol etc.   Orders:    Hemoglobin A1C With EAG; Future    Lipid panel; Future    Comprehensive metabolic panel; Future    ALPRAZolam (XANAX) 0.25 mg tablet; Take 1 tablet (0.25 mg total) by mouth daily at bedtime as needed for anxiety or sleep    Cigar smoker  Strongly advised pt to quit!       Alcohol use  Limit to 2 drinks/day.        Annual physical exam           Reviewed lab in 3/2025  CBC normal  CMP ok fasting glucose 110  Lipid 173/53/79/81 normal  PSA 0.66 good    Flu shot yearly.   Got covid19 shots and boosters.   Got PCV20 in .   Tdap .   Got shingrix per pt.   PSA yearly. Pros and cons educated pt.  Colonoscopy 2022,  Repeat in 5 years.   RTO in 6 months.         Preventive Screenings:  - Diabetes Screening: screening up-to-date  - Cholesterol Screening: screening not indicated and has hyperlipidemia   - Hepatitis C screening: screening up-to-date   - HIV screening: screening up-to-date   - Colon cancer screening: screening up-to-date   - Lung cancer screening:  screening not indicated   - Prostate cancer screening: screening up-to-date     Immunizations:  - Immunizations due: Influenza      Depression Screening and Follow-up Plan: Patient was screened for depression during today's encounter. They screened negative with a PHQ-2 score of 0.      Tobacco Cessation Counseling: Tobacco cessation counseling was provided. The patient is sincerely urged to quit consumption of tobacco. He is not ready to quit tobacco. Medication options and side effects of medication discussed. Patient refused medication.         History of Present Illness       Pt is here by himself.      Hyperlipidemia---He is on simvastatin 10mg qhs. Denies SE.   Eat healthy.    Father had MI at age of 66.      Anxiety---He uses xanax 0.25mg at work night as needed which helped.      Lower Back pain---years. Lower middle back pain. A lot of physical job at work/warehouse.   He uses flexeril daily which helped.   Saw pain specialist and chiropractor before, not any more.     Cigar on weekends.   Beer 9 drinks/week.   No drugs.       Denies depression.       Adult Annual Physical:  Patient presents for annual physical.     Diet and Physical Activity:  - Diet/Nutrition: well balanced diet.  - Exercise: walking. physical work    Depression Screening:  - PHQ-2 Score: 0    General Health:  - Sleep: sleeps well. work night shift  - Hearing: normal hearing bilateral ears.  - Vision: wears glasses.  - Dental: regular dental visits.     Health:    - Urinary symptoms: none.     Review of Systems   Constitutional:  Negative for appetite change, chills and fever.   HENT:  Negative for congestion, ear pain, sinus pain and sore throat.    Eyes:  Negative for discharge and itching.   Respiratory:  Negative for apnea, cough, chest tightness, shortness of breath and wheezing.    Cardiovascular:  Negative for chest pain, palpitations and leg swelling.   Gastrointestinal:  Negative for abdominal pain, anal bleeding, constipation,  "diarrhea, nausea and vomiting.   Endocrine: Negative for cold intolerance, heat intolerance and polyuria.   Genitourinary:  Negative for difficulty urinating and dysuria.   Musculoskeletal:  Negative for arthralgias, back pain and myalgias.   Skin:  Negative for rash.   Neurological:  Negative for dizziness and headaches.   Psychiatric/Behavioral:  Negative for agitation.          Objective   /70 (BP Location: Right arm, Patient Position: Sitting, Cuff Size: Standard)   Pulse 98   Temp 98.4 °F (36.9 °C) (Temporal)   Resp 16   Ht 5' 9\" (1.753 m)   Wt 65.3 kg (144 lb)   SpO2 98%   BMI 21.27 kg/m²     Physical Exam  Constitutional:       Appearance: He is well-developed.   HENT:      Head: Normocephalic and atraumatic.   Eyes:      General:         Right eye: No discharge.         Left eye: No discharge.      Conjunctiva/sclera: Conjunctivae normal.   Cardiovascular:      Rate and Rhythm: Normal rate and regular rhythm.      Heart sounds: Normal heart sounds. No murmur heard.     No friction rub. No gallop.   Pulmonary:      Effort: Pulmonary effort is normal. No respiratory distress.      Breath sounds: Normal breath sounds. No wheezing or rales.   Abdominal:      General: Bowel sounds are normal. There is no distension.      Palpations: Abdomen is soft.      Tenderness: There is no abdominal tenderness. There is no guarding.   Musculoskeletal:         General: Normal range of motion.      Cervical back: Normal range of motion and neck supple.      Right lower leg: No edema.      Left lower leg: No edema.   Neurological:      Mental Status: He is alert.         "